# Patient Record
Sex: FEMALE | Race: ASIAN | NOT HISPANIC OR LATINO | Employment: UNEMPLOYED | URBAN - METROPOLITAN AREA
[De-identification: names, ages, dates, MRNs, and addresses within clinical notes are randomized per-mention and may not be internally consistent; named-entity substitution may affect disease eponyms.]

---

## 2024-02-23 ENCOUNTER — OFFICE VISIT (OUTPATIENT)
Dept: FAMILY MEDICINE | Facility: CLINIC | Age: 39
End: 2024-02-23
Payer: COMMERCIAL

## 2024-02-23 VITALS
OXYGEN SATURATION: 98 % | RESPIRATION RATE: 16 BRPM | DIASTOLIC BLOOD PRESSURE: 72 MMHG | TEMPERATURE: 98 F | HEART RATE: 77 BPM | SYSTOLIC BLOOD PRESSURE: 105 MMHG

## 2024-02-23 DIAGNOSIS — G44.219 EPISODIC TENSION-TYPE HEADACHE, NOT INTRACTABLE: ICD-10-CM

## 2024-02-23 DIAGNOSIS — R10.13 EPIGASTRIC BURNING SENSATION: ICD-10-CM

## 2024-02-23 DIAGNOSIS — R42 DIZZINESS: Primary | ICD-10-CM

## 2024-02-23 PROCEDURE — 84443 ASSAY THYROID STIM HORMONE: CPT | Performed by: FAMILY MEDICINE

## 2024-02-23 PROCEDURE — 80053 COMPREHEN METABOLIC PANEL: CPT | Performed by: FAMILY MEDICINE

## 2024-02-23 PROCEDURE — 99204 OFFICE O/P NEW MOD 45 MIN: CPT | Performed by: FAMILY MEDICINE

## 2024-02-23 RX ORDER — PANTOPRAZOLE SODIUM 40 MG/1
40 TABLET, DELAYED RELEASE ORAL DAILY
COMMUNITY
End: 2024-02-23

## 2024-02-23 RX ORDER — MECLIZINE HYDROCHLORIDE 25 MG/1
25 TABLET ORAL 3 TIMES DAILY PRN
Qty: 30 TABLET | Refills: 1 | Status: SHIPPED | OUTPATIENT
Start: 2024-02-23 | End: 2024-04-18

## 2024-02-23 RX ORDER — ACETAMINOPHEN 325 MG/1
650 TABLET ORAL EVERY 8 HOURS PRN
Qty: 100 TABLET | Refills: 0 | Status: SHIPPED | OUTPATIENT
Start: 2024-02-23 | End: 2024-07-11

## 2024-02-23 RX ORDER — PANTOPRAZOLE SODIUM 40 MG/1
40 TABLET, DELAYED RELEASE ORAL DAILY
Qty: 30 TABLET | Refills: 1 | Status: SHIPPED | OUTPATIENT
Start: 2024-02-23 | End: 2024-04-18

## 2024-04-17 SDOH — HEALTH STABILITY: PHYSICAL HEALTH: ON AVERAGE, HOW MANY MINUTES DO YOU ENGAGE IN EXERCISE AT THIS LEVEL?: 20 MIN

## 2024-04-17 SDOH — HEALTH STABILITY: PHYSICAL HEALTH: ON AVERAGE, HOW MANY DAYS PER WEEK DO YOU ENGAGE IN MODERATE TO STRENUOUS EXERCISE (LIKE A BRISK WALK)?: 2 DAYS

## 2024-04-17 ASSESSMENT — SOCIAL DETERMINANTS OF HEALTH (SDOH): HOW OFTEN DO YOU GET TOGETHER WITH FRIENDS OR RELATIVES?: NEVER

## 2024-04-18 ENCOUNTER — VIRTUAL VISIT (OUTPATIENT)
Dept: INTERPRETER SERVICES | Facility: CLINIC | Age: 39
End: 2024-04-18

## 2024-04-18 ENCOUNTER — OFFICE VISIT (OUTPATIENT)
Dept: FAMILY MEDICINE | Facility: CLINIC | Age: 39
End: 2024-04-18
Payer: COMMERCIAL

## 2024-04-18 VITALS
SYSTOLIC BLOOD PRESSURE: 102 MMHG | WEIGHT: 137.4 LBS | DIASTOLIC BLOOD PRESSURE: 71 MMHG | HEART RATE: 77 BPM | BODY MASS INDEX: 26.97 KG/M2 | HEIGHT: 60 IN | TEMPERATURE: 98.2 F | RESPIRATION RATE: 16 BRPM | OXYGEN SATURATION: 100 %

## 2024-04-18 DIAGNOSIS — Z11.4 SCREENING FOR HIV (HUMAN IMMUNODEFICIENCY VIRUS): ICD-10-CM

## 2024-04-18 DIAGNOSIS — Z11.59 NEED FOR HEPATITIS C SCREENING TEST: ICD-10-CM

## 2024-04-18 DIAGNOSIS — R42 DIZZINESS: ICD-10-CM

## 2024-04-18 DIAGNOSIS — R10.13 EPIGASTRIC BURNING SENSATION: ICD-10-CM

## 2024-04-18 DIAGNOSIS — Z12.4 CERVICAL CANCER SCREENING: ICD-10-CM

## 2024-04-18 DIAGNOSIS — Z12.31 VISIT FOR SCREENING MAMMOGRAM: ICD-10-CM

## 2024-04-18 DIAGNOSIS — F17.200 TOBACCO USE DISORDER: ICD-10-CM

## 2024-04-18 DIAGNOSIS — F41.9 ANXIETY: ICD-10-CM

## 2024-04-18 DIAGNOSIS — Z00.00 ENCOUNTER FOR ANNUAL PHYSICAL EXAM: Primary | ICD-10-CM

## 2024-04-18 LAB — FOLATE SERPL-MCNC: 6.2 NG/ML (ref 4.6–34.8)

## 2024-04-18 PROCEDURE — 82746 ASSAY OF FOLIC ACID SERUM: CPT | Performed by: FAMILY MEDICINE

## 2024-04-18 PROCEDURE — 82607 VITAMIN B-12: CPT | Performed by: FAMILY MEDICINE

## 2024-04-18 PROCEDURE — 86803 HEPATITIS C AB TEST: CPT | Performed by: FAMILY MEDICINE

## 2024-04-18 PROCEDURE — 87389 HIV-1 AG W/HIV-1&-2 AB AG IA: CPT | Performed by: FAMILY MEDICINE

## 2024-04-18 PROCEDURE — 99214 OFFICE O/P EST MOD 30 MIN: CPT | Mod: 25 | Performed by: FAMILY MEDICINE

## 2024-04-18 PROCEDURE — 99396 PREV VISIT EST AGE 40-64: CPT | Performed by: FAMILY MEDICINE

## 2024-04-18 PROCEDURE — 36415 COLL VENOUS BLD VENIPUNCTURE: CPT | Performed by: FAMILY MEDICINE

## 2024-04-18 PROCEDURE — 80061 LIPID PANEL: CPT | Performed by: FAMILY MEDICINE

## 2024-04-18 RX ORDER — HYDROXYZINE HYDROCHLORIDE 10 MG/1
10-20 TABLET, FILM COATED ORAL EVERY 8 HOURS PRN
Qty: 60 TABLET | Refills: 1 | Status: SHIPPED | OUTPATIENT
Start: 2024-04-18 | End: 2024-06-21

## 2024-04-18 RX ORDER — PANTOPRAZOLE SODIUM 40 MG/1
40 TABLET, DELAYED RELEASE ORAL DAILY
Qty: 30 TABLET | Refills: 1 | Status: SHIPPED | OUTPATIENT
Start: 2024-04-18 | End: 2024-06-21

## 2024-04-18 RX ORDER — MECLIZINE HYDROCHLORIDE 25 MG/1
25 TABLET ORAL 3 TIMES DAILY PRN
Qty: 30 TABLET | Refills: 1 | Status: SHIPPED | OUTPATIENT
Start: 2024-04-18 | End: 2024-07-23

## 2024-04-18 ASSESSMENT — PATIENT HEALTH QUESTIONNAIRE - PHQ9
SUM OF ALL RESPONSES TO PHQ QUESTIONS 1-9: 4
5. POOR APPETITE OR OVEREATING: NOT AT ALL

## 2024-04-18 ASSESSMENT — ANXIETY QUESTIONNAIRES
GAD7 TOTAL SCORE: 2
1. FEELING NERVOUS, ANXIOUS, OR ON EDGE: NOT AT ALL
5. BEING SO RESTLESS THAT IT IS HARD TO SIT STILL: SEVERAL DAYS
GAD7 TOTAL SCORE: 2
6. BECOMING EASILY ANNOYED OR IRRITABLE: NOT AT ALL
3. WORRYING TOO MUCH ABOUT DIFFERENT THINGS: NOT AT ALL
2. NOT BEING ABLE TO STOP OR CONTROL WORRYING: SEVERAL DAYS
7. FEELING AFRAID AS IF SOMETHING AWFUL MIGHT HAPPEN: NOT AT ALL
IF YOU CHECKED OFF ANY PROBLEMS ON THIS QUESTIONNAIRE, HOW DIFFICULT HAVE THESE PROBLEMS MADE IT FOR YOU TO DO YOUR WORK, TAKE CARE OF THINGS AT HOME, OR GET ALONG WITH OTHER PEOPLE: SOMEWHAT DIFFICULT

## 2024-04-18 NOTE — PATIENT INSTRUCTIONS
-Thank you for choosing the [facility].  -It was a pleasure to see you today.  -Please take a look at the information below for more specific details regarding the treatment plan and recommendations.  -In this after visit summary is a list of your medications and specific instructions.  Please review this carefully as there may be changes made to your medication list.  -If there are any particular questions or concerns, please feel free to reach out to Dr. Harper.  -If any labs have been completed, we will reach out to you about results.  If the results are normal or not concerning, a letter or MyChart message will be sent to you.  If any follow-up is needed, either Dr. Harper or the nurse will give you a call.  If you have not heard regarding results after 2 weeks, please reach out to the clinic.    Patient Instructions:    -Stop smoking cigarettes.  This is the best thing you can do to improve your health.    -Take the medications as prescribed.  -Return to clinic in 1-2 months to remove the nexplanon.     -You are doing great.  -Continue to eat well.  Try to increase your servings of calcium as this can help your bones stay strong and healthy.  Follow a nutrition plan patient fruits and vegetables and low in fats and cholesterol.    -Be sure to eat 5-7 servings of fruits and vegetables each day.  -Find ways to stay active.  Try to get 150 minutes of moderate activity (where you are breathing faster and slightly sweating) each week.  -Try to maintain a body mass index (BMI) of 18.5-25 as this is considered a healthier weight range.  -Brush your teeth twice daily.  See a dentist every 6-12 months.  -Be sure to use sunblock with SPF 15 or greater when going outside for extended periods of time.  Sunblock should be used even when it is a cloudy day.  Do intermittent skin checks for any concerning skin changes.  Wearing a wide brimmed hat and sunglasses can also be helpful to protect your skin from  the sun.  -Monitor for any abnormal skin changes (such as new moles/spots, painful moles, changes in your old moles, wounds that will not heal, multiple colors noted in one lesion, lesions that are asymmetric or not circular, or anything that is concerning for you). If any of these are noted, please schedule an appointment to be seen.     -It is generally recommended for you to complete a health care directive or living will. These documents will be able to reflect your wishes and desire in the case that you are unable to express them yourself. Please let Dr. Harper know if you would like some assistance with this process.    -For the preventive health procedure (such as colonoscopy, mammogram, etc) order from today, if you do not hear within a week's time from the specialist to schedule an appointment, please call the [facility] and speak with the specialty  to help you schedule the appointment. Depending on the specialist availability, it may be a number of weeks prior to your scheduled appointment.        Please seek immediate medical attention (go to the emergency room or urgent care) for the following reasons: worsening symptoms, or any concerning changes.      --------------------------------------------------------------------------------------------------------------------    -We are always looking for ways to improve.  You may be selected to receive a survey regarding your visit today.  We encourage you to complete the survey and provide specific, constructive feedback to help us improve our processes.  Thank you for your time!  -Please review the contact information listed on the after visit summary and in the electronic chart.  Below is the phone number that we have on file.  If there are any changes that are needed to be made, please reach out to the clinic.  [phone] (home)

## 2024-04-18 NOTE — PROGRESS NOTES
Preventive Care Visit  [facility] RAYMUNDO Harper MD, Family Medicine  Apr 18, 2024      Assessment & Plan     Patient Instructions:    -Stop smoking cigarettes.  This is the best thing you can do to improve your health.    -Take the medications as prescribed.  -Return to clinic in 1-2 months to remove the nexplanon.     -You are doing great.  -Continue to eat well.  Try to increase your servings of calcium as this can help your bones stay strong and healthy.  Follow a nutrition plan patient fruits and vegetables and low in fats and cholesterol.    -Be sure to eat 5-7 servings of fruits and vegetables each day.  -Find ways to stay active.  Try to get 150 minutes of moderate activity (where you are breathing faster and slightly sweating) each week.  -Try to maintain a body mass index (BMI) of 18.5-25 as this is considered a healthier weight range.  -Brush your teeth twice daily.  See a dentist every 6-12 months.  -Be sure to use sunblock with SPF 15 or greater when going outside for extended periods of time.  Sunblock should be used even when it is a cloudy day.  Do intermittent skin checks for any concerning skin changes.  Wearing a wide brimmed hat and sunglasses can also be helpful to protect your skin from the sun.  -Monitor for any abnormal skin changes (such as new moles/spots, painful moles, changes in your old moles, wounds that will not heal, multiple colors noted in one lesion, lesions that are asymmetric or not circular, or anything that is concerning for you). If any of these are noted, please schedule an appointment to be seen.     -It is generally recommended for you to complete a health care directive or living will. These documents will be able to reflect your wishes and desire in the case that you are unable to express them yourself. Please let Dr. Harper know if you would like some assistance with this process.    -For the preventive health procedure (such as colonoscopy,  mammogram, etc) order from today, if you do not hear within a week's time from the specialist to schedule an appointment, please call the [facility] and speak with the specialty  to help you schedule the appointment. Depending on the specialist availability, it may be a number of weeks prior to your scheduled appointment.        Please seek immediate medical attention (go to the emergency room or urgent care) for the following reasons: worsening symptoms, or any concerning changes.    Say was seen today for physical.  Diagnoses and all orders for this visit:    Encounter for annual physical exam  -     Lipid panel reflex to direct LDL Non-fasting; Future    Visit for screening mammogram: declined.     Screening for HIV (human immunodeficiency virus)  -     HIV Antigen Antibody Combo; Future    Need for hepatitis C screening test  -     Hepatitis C Screen Reflex to HCV RNA Quant and Genotype; Future    Cervical cancer screening: patient will schedule with a female provider.     Tobacco use disorder: patient will work on this and reach out if she needs help.     Dizziness  Epigastric burning sensation: check labs as below. Plan to continue protonix and start meclizine.   -     meclizine (ANTIVERT) 25 MG tablet; Take 1 tablet (25 mg) by mouth 3 times daily as needed for dizziness  -     Lipid panel reflex to direct LDL Non-fasting; Future  -     Helicobacter pylori Antigen Stool; Future  -     pantoprazole (PROTONIX) 40 MG EC tablet; Take 1 tablet (40 mg) by mouth daily  -     Vitamin B12; Future  -     Folate; Future    Anxiety: has done well on a faster acting medication before. Unable to find medication in chart. Discussed options, risks, benefits and plans are for the below.   -     Vitamin B12; Future  -     Folate; Future  -     hydrOXYzine HCl (ATARAX) 10 MG tablet; Take 1-2 tablets (10-20 mg) by mouth every 8 hours as needed for itching or anxiety (sleep)      Patient has been advised of split  "billing requirements and indicates understanding: Yes (by nurse)        BMI  Estimated body mass index is 26.45 kg/m  as calculated from the following:    Height as of this encounter: 1.535 m (5' 0.43\").    Weight as of this encounter: 62.3 kg (137 lb 6.4 oz).       Counseling  Appropriate preventive services were discussed with this patient, including applicable screening as appropriate for fall prevention, nutrition, physical activity, Tobacco-use cessation, weight loss and cognition.  Checklist reviewing preventive services available has been given to the patient.  Reviewed patient's diet, addressing concerns and/or questions.   She is at risk for lack of exercise and has been provided with information to increase physical activity for the benefit of her well-being.   Patient is at risk for social isolation and has been provided with information about the benefit of social connection.   The patient was instructed to see the dentist every 6 months.   She is at risk for psychosocial distress and has been provided with information to reduce risk.       Subjective   Say is a 39 year old, presenting for the following:  Physical        4/18/2024     9:20 AM   Additional Questions   Roomed by olivia guaman   Accompanied by Self        Health Care Directive  Patient does not have a Health Care Directive or Living Will: See AVS.    HPI    The patient is new to the clinic and had been seen 1 time for an acute visit.    Wants Nexplanon removal. Placed in 2021 (around the spring/summer time) in the left arm. No problems with the nexplanon. Patient would like to use the depo shot going forward after removal. Due to time constraints, patient will schedule for a follow up.     The pantoprazole is helpful.     The meclizine is helpful with the nausea symptoms. The dizziness is sometimes better, sometimes worse. The medications are helpful overall for the headaches and associated symptoms. She feels that the right side of the head is " heavy. Reviewed treatment options.     The medications help a lot.     She has mental health issues that come and goes. When it occurs, she has pains in her back. She thinks she is going through menopause because these changes are typically noted when she is menstruating. She continues to menstruate regularly. There are discomforts in the heart that then triggers feeling unwell mentally. Symptoms started when she delivered her baby and can occur every 1-2 months. In the past, she had been prescribed an antianxiety medication and it was helpful. She doesn't recall the name. The medication was very helpful. This medication was only used as needed. No daily medication.           2024    10:35 AM   PHQ   PHQ-9 Total Score 4   Q9: Thoughts of better off dead/self-harm past 2 weeks Not at all         2024    10:35 AM   KARISSA-7 SCORE   Total Score 2           Patient is originally from [place]. Moved to [place] 8 years ago. Lived in [place] and then moved to [place] 8-9 months ago.     QUIRINO signed for clinic in [place].     Medical history:   -Denies history of CAD, diabetes, hypertension.      Surgical history:   -  -Tubes were tied, she gained weight, and the tie slipped and she ended up having another baby after that.  She has a nexplanon in place now.     Family history:  -Denies family history of CAD, diabetes, hypertension, breast cancer.       -Reviewed healthy eating and exercise.  -Skin cancer screening: No concerning skin changes expressed by patient.  -Smoking status:   Tobacco Use      Smoking status: Every Day        Types: Cigarettes        Passive exposure: Current      Smokeless tobacco: Never  .  Reviewed smoking cessation recommendations.      Preventative health recommendations, evaluation options, and risk/benefits of each were discussed with patient. Accepted recommendations were ordered. Will await records before giving vaccinations.   Health Maintenance Due   Topic Date Due    YEARLY  PREVENTIVE VISIT  Never done    ANNUAL REVIEW OF HM ORDERS  Never done    ADVANCE CARE PLANNING  Never done    MAMMO SCREENING  Never done    Pneumococcal Vaccine: Pediatrics (0 to 5 Years) and At-Risk Patients (6 to 64 Years) (1 of 2 - PCV) Never done    HIV SCREENING  Never done    HEPATITIS C SCREENING  Never done    HEPATITIS B IMMUNIZATION (1 of 3 - 19+ 3-dose series) Never done    PAP  Never done    DTAP/TDAP/TD IMMUNIZATION (1 - Tdap) Never done    LIPID  Never done    INFLUENZA VACCINE (1) Never done    COVID-19 Vaccine (1 - 2023-24 season) Never done       -Immunizations due were reviewed.      There is no immunization history on file for this patient.    -Labs: Laboratory recommendations reviewed with patient.  Reviewed normal labs from 2/23/2024 which included: CMP, A1c, TSH, CBC.  -STD testing recommended (including HIV, Hep C)    -Breast cancer screening: biennial screening mammography for women aged 50 to 74 years. Last mammogram: never. Patient is low risk. Patient defers testing at this time.      -Cervical cancer screening: Last Pap smear: Never done. Patient prefers a female provider.             4/17/2024   General Health   How would you rate your overall physical health? (!) FAIR   Feel stress (tense, anxious, or unable to sleep) Only a little   (!) STRESS CONCERN      4/17/2024   Nutrition   Three or more servings of calcium each day? Yes   Diet: Regular (no restrictions)   How many servings of fruit and vegetables per day? (!) 0-1   How many sweetened beverages each day? 0-1         4/17/2024   Exercise   Days per week of moderate/strenous exercise 2 days   Average minutes spent exercising at this level 20 min   (!) EXERCISE CONCERN      4/17/2024   Social Factors   Frequency of gathering with friends or relatives Never   Worry food won't last until get money to buy more No   Food not last or not have enough money for food? No   Do you have housing?  Yes   Are you worried about losing your  housing? No   Lack of transportation? No   Unable to get utilities (heat,electricity)? No   (!) SOCIAL CONNECTIONS CONCERN      4/17/2024   Dental   Dentist two times every year? (!) NO         4/17/2024   TB Screening   Were you born outside of the [place]? Yes         Today's PHQ-2 Score:       4/17/2024     2:27 PM   PHQ-2 ( 1999 [employer])   Q1: Little interest or pleasure in doing things 1   Q2: Feeling down, depressed or hopeless 1   PHQ-2 Score 2   Q1: Little interest or pleasure in doing things Several days   Q2: Feeling down, depressed or hopeless Several days   PHQ-2 Score 2           4/17/2024   Substance Use   If I could quit smoking, I would Somewhat agree   I want to quit somking, worry about health affects Somewhat agree   Willing to make a plan to quit smoking Somewhat agree   Willing to cut down before quitting Somewhat agree   Alcohol more than 3/day or more than 7/wk No   Do you use any other substances recreationally? No     Social History     Tobacco Use    Smoking status: Every Day     Types: Cigarettes     Passive exposure: Current    Smokeless tobacco: Never   Vaping Use    Vaping status: Never Used             4/17/2024   One time HIV Screening   Previous HIV test? No         4/17/2024   STI Screening   New sexual partner(s) since last STI/HIV test? No     History of abnormal Pap smear: No previous history available.       ASCVD Risk   The 10-year ASCVD risk score (Michael MANNING, et al., 2019) is: 1.5%    Values used to calculate the score:      Age: 39 years      Sex: Female      Is Non- : No      Diabetic: No      Tobacco smoker: Yes      Systolic Blood Pressure: 102 mmHg      Is BP treated: No      HDL Cholesterol: 61 mg/dL      Total Cholesterol: 213 mg/dL        4/17/2024   Contraception/Family Planning   Questions about contraception or family planning No       Reviewed and updated as needed this visit by Provider     Meds  Problems  Med Hx  Surg Hx  Fam Hx     "        History reviewed. No pertinent past medical history.  Past Surgical History:   Procedure Laterality Date    C/SECTION, LOW TRANSVERSE         The 10 point review of system was negative unless otherwise stated in the HPI.       Objective    Exam  /71 (BP Location: Right arm, Patient Position: Sitting, Cuff Size: Adult Regular)   Pulse 77   Temp 98.2  F (36.8  C) (Oral)   Resp 16   Ht 1.535 m (5' 0.43\")   Wt 62.3 kg (137 lb 6.4 oz)   LMP 02/13/2024 (Approximate)   SpO2 100%   BMI 26.45 kg/m     Estimated body mass index is 26.45 kg/m  as calculated from the following:    Height as of this encounter: 1.535 m (5' 0.43\").    Weight as of this encounter: 62.3 kg (137 lb 6.4 oz).    Physical Exam  GENERAL: alert and no distress  EYES: Eyes grossly normal to inspection, PERRL and conjunctivae and sclerae normal  HENT: ear canals and TM's normal, nose and mouth without ulcers or lesions  NECK: no adenopathy, no asymmetry, masses, or scars  RESP: lungs clear to auscultation - no rales, rhonchi or wheezes  CV: regular rate and rhythm, normal S1 S2, no S3 or S4, no murmur, click or rub, no peripheral edema  ABDOMEN: mild epigastric discomforts. Otherwise, abdomen is soft, nontender, no hepatosplenomegaly, no masses and bowel sounds normal  MS: no gross musculoskeletal defects noted, no edema  SKIN: no suspicious lesions or rashes  NEURO: Normal strength and tone, mentation intact and speech normal  PSYCH: mentation appears normal, affect normal/bright        Signed Electronically by:       Abisai Harper MD  [facility] [place] [address]  Phone: [phone]  Fax: [phone]    4/22/2024  6:38 AM    "

## 2024-04-19 LAB
CHOLEST SERPL-MCNC: 213 MG/DL
FASTING STATUS PATIENT QL REPORTED: YES
HDLC SERPL-MCNC: 61 MG/DL
HIV 1+2 AB+HIV1 P24 AG SERPL QL IA: NONREACTIVE
LDLC SERPL CALC-MCNC: 123 MG/DL
NONHDLC SERPL-MCNC: 152 MG/DL
TRIGL SERPL-MCNC: 143 MG/DL
VIT B12 SERPL-MCNC: 554 PG/ML (ref 232–1245)

## 2024-04-19 PROCEDURE — 87338 HPYLORI STOOL AG IA: CPT | Performed by: FAMILY MEDICINE

## 2024-04-20 LAB — HCV AB SERPL QL IA: NONREACTIVE

## 2024-04-22 PROBLEM — F17.200 TOBACCO USE DISORDER: Status: ACTIVE | Noted: 2024-04-22

## 2024-04-22 PROBLEM — R10.13 EPIGASTRIC BURNING SENSATION: Status: ACTIVE | Noted: 2024-04-22

## 2024-04-22 PROBLEM — R42 DIZZINESS: Status: ACTIVE | Noted: 2024-04-22

## 2024-04-22 PROBLEM — F41.9 ANXIETY: Status: ACTIVE | Noted: 2024-04-22

## 2024-04-22 LAB — H PYLORI AG STL QL IA: NEGATIVE

## 2024-04-23 ENCOUNTER — TELEPHONE (OUTPATIENT)
Dept: FAMILY MEDICINE | Facility: CLINIC | Age: 39
End: 2024-04-23
Payer: COMMERCIAL

## 2024-04-23 NOTE — TELEPHONE ENCOUNTER
----- Message from Abisai Harper MD sent at 4/23/2024  5:11 PM CDT -----  Team - please call patient with results.    Hello Say Hiwot Renato,    I hope you have been well since our last visit. Below are the results from the testing completed at the visit.     The total cholesterol and LDL or bad cholesterol are high.  The triglycerides, which is primarily affected by food, is normal. The HDL or good cholesterol are in the normal range.  No medications are recommended at this time, though you should try to follow a diet that is rich in fruits and vegetables and low in fats and cholesterol.  In addition, find ways to stay active.  Doing aerobic activities (such as running, biking, etc.) will help improve the HDL or good cholesterol.      The rest of the tests were in the good range.    Dr. Harper recommends that you continue on the plan as discussed in clinic.    If there are any questions or concerns, please call the clinic or schedule an appointment for follow up.     Best wishes,           Abisai Harper MD  [facility]  4/23/2024  5:11 PM

## 2024-06-21 ENCOUNTER — OFFICE VISIT (OUTPATIENT)
Dept: FAMILY MEDICINE | Facility: CLINIC | Age: 39
End: 2024-06-21
Payer: COMMERCIAL

## 2024-06-21 VITALS
DIASTOLIC BLOOD PRESSURE: 66 MMHG | SYSTOLIC BLOOD PRESSURE: 106 MMHG | TEMPERATURE: 98.1 F | OXYGEN SATURATION: 99 % | RESPIRATION RATE: 14 BRPM | HEART RATE: 73 BPM

## 2024-06-21 DIAGNOSIS — F41.9 ANXIETY: ICD-10-CM

## 2024-06-21 DIAGNOSIS — L30.9 DERMATITIS: ICD-10-CM

## 2024-06-21 DIAGNOSIS — J02.9 PHARYNGITIS, UNSPECIFIED ETIOLOGY: ICD-10-CM

## 2024-06-21 DIAGNOSIS — L50.3 DERMATOGRAPHIA: ICD-10-CM

## 2024-06-21 DIAGNOSIS — R10.13 EPIGASTRIC BURNING SENSATION: Primary | ICD-10-CM

## 2024-06-21 LAB
DEPRECATED S PYO AG THROAT QL EIA: NEGATIVE
GROUP A STREP BY PCR: NOT DETECTED

## 2024-06-21 PROCEDURE — 99215 OFFICE O/P EST HI 40 MIN: CPT | Performed by: FAMILY MEDICINE

## 2024-06-21 PROCEDURE — 87651 STREP A DNA AMP PROBE: CPT | Performed by: FAMILY MEDICINE

## 2024-06-21 RX ORDER — LORATADINE 10 MG/1
10 TABLET ORAL DAILY
Qty: 30 TABLET | Refills: 0 | Status: SHIPPED | OUTPATIENT
Start: 2024-06-21 | End: 2024-07-21

## 2024-06-21 RX ORDER — HYDROXYZINE HYDROCHLORIDE 10 MG/1
10-20 TABLET, FILM COATED ORAL EVERY 8 HOURS PRN
Qty: 60 TABLET | Refills: 0 | Status: SHIPPED | OUTPATIENT
Start: 2024-06-21 | End: 2024-07-23

## 2024-06-21 RX ORDER — TRIAMCINOLONE ACETONIDE 1 MG/G
CREAM TOPICAL 2 TIMES DAILY
Qty: 80 G | Refills: 0 | Status: SHIPPED | OUTPATIENT
Start: 2024-06-21

## 2024-06-21 RX ORDER — PANTOPRAZOLE SODIUM 40 MG/1
40 TABLET, DELAYED RELEASE ORAL DAILY
Qty: 30 TABLET | Refills: 0 | Status: SHIPPED | OUTPATIENT
Start: 2024-06-21 | End: 2024-07-11

## 2024-06-21 NOTE — PATIENT INSTRUCTIONS
Start the new prescription of pantoprazole daily for the next 30 days for the burning sensation in your stomach    The hydroxyzine at bedtime to help with the burning sensation on your back until the rash resolves    you can also take it up to 6 every 6 hours during the day if you are having anxiety or worsening burning this will make you sleepy    Apply triamcinolone cream to your back in a.m. and bedtime once the rash resolves stop the triamcinolone cream, do not use it more than 2 weeks    Start Claritin 10 mg daily for the next 30 days to treat the rash on your back and complete the full 30 days of the rash does not come back        Return to clinic or to ER if symptoms worsen     Follow up with your primary care provider or clinic in about 2-3 days  if your symptoms do not improve                          please make an appointment with your primary care provider to be seen in 2 weeks to follow-up on the burning in your stomach-    patient has an appointment with PCP on 7/11/2024-can follow up on stomach burning, no need to make a follow up appointment in 2 weeks-reviewed with patient at time of visit and she understands and agrees with plan

## 2024-06-21 NOTE — PROGRESS NOTES
ASSESSMENT/PLAN:      ICD-10-CM    1. Epigastric burning sensation  R10.13 pantoprazole (PROTONIX) 40 MG EC tablet    hx of similar year ago, no pain with palpation but pt feeling the burning, only takes pantoprazole prn, will resume daily dosing which resolved symptoms in past      2. Dermatitis  L30.9 hydrOXYzine HCl (ATARAX) 10 MG tablet     triamcinolone (KENALOG) 0.1 % external cream     loratadine (CLARITIN) 10 MG tablet    back      3. Dermatographia  L50.3 hydrOXYzine HCl (ATARAX) 10 MG tablet     loratadine (CLARITIN) 10 MG tablet    note on exam of rash on back      4. Pharyngitis, unspecified etiology  J02.9 Streptococcus A Rapid Screen w/Reflex to PCR - Clinic Collect     Group A Streptococcus PCR Throat Swab    rapid strep negative      5. Anxiety  F41.9 hydrOXYzine HCl (ATARAX) 10 MG tablet          Patient Instructions     Start the new prescription of pantoprazole daily for the next 30 days for the burning sensation in your stomach    The hydroxyzine at bedtime to help with the burning sensation on your back until the rash resolves    you can also take it up to 6 every 6 hours during the day if you are having anxiety or worsening burning this will make you sleepy    Apply triamcinolone cream to your back in a.m. and bedtime once the rash resolves stop the triamcinolone cream, do not use it more than 2 weeks    Start Claritin 10 mg daily for the next 30 days to treat the rash on your back and complete the full 30 days of the rash does not come back        Return to clinic or to ER if symptoms worsen     Follow up with your primary care provider or clinic in about 2-3 days  if your symptoms do not improve                          please make an appointment with your primary care provider to be seen in 2 weeks to follow-up on the burning in your stomach-    patient has an appointment with PCP on 7/11/2024-can follow up on stomach burning, no need to make a follow up appointment in 2 weeks-reviewed  with patient at time of visit and she understands and agrees with plan     Reviewed medication instructions and side effects. Follow up if experiences side effects.     I reviewed supportive care, otc meds to use if needed, expected course, and signs of concern.  Follow up as needed or if she does not improve within  1-2 days or if worsens in any way.  Reviewed red flag symptoms and is to go to the ER if experiences any of these.     The use of Dragon/[employer] services may have been used to construct the content in this note; any grammatical or spelling errors are non-intentional. Please contact the author of this note directly if you are in need of any clarification.      On the day of the encounter, time spend on chart review, patient visit, review of testing, documentation was 40 minutes      Due to language barrier, an  was present during the history-taking and subsequent discussion  and the physical exam with this patient.         Patient presents with:  Abdominal Pain: Burning in lower abdomin and over body for last 4 day.  Chest Pain: Occasional sharp pain lower chest to back.       Subjective     Say Hiwot Gross is a 39 year old female who presents to clinic today for the following health issues:    HPI   Patient is a 39-year-old female with history of anxiety, episodic gastric burning, anxiety and dizziness  Also continues to smoke  Patient is on meclizine for dizziness hydroxyzine for anxiety and pantoprazole for her GERD  Patient with onset 4 days ago of midepigastric burning and burning pain in her back-similar symptoms about a year ago when she was started on her pantoprazole  At present she is been taking her pantoprazole as needed last time she took a dose was 5 days ago  She is also prescribed hydroxyzine for anxiety she has not taken in the past week  Mild nausea no vomiting or diarrhea, no constipation  No fever or chills, no sore throat, no cough, no dizziness, no UTI  symptoms  Nexplanon for birth control  No other family members are ill at this time no one with history of recent strep or  flu          No past medical history on file.  Social History     Tobacco Use    Smoking status: Every Day     Types: Cigarettes     Passive exposure: Current    Smokeless tobacco: Never   Substance Use Topics    Alcohol use: Not on file       Current Outpatient Medications   Medication Sig Dispense Refill    acetaminophen (TYLENOL) 325 MG tablet Take 2 tablets (650 mg) by mouth every 8 hours as needed for headaches 100 tablet 0    hydrOXYzine HCl (ATARAX) 10 MG tablet Take 1-2 tablets (10-20 mg) by mouth every 8 hours as needed for itching or anxiety (sleep) 60 tablet 0    loratadine (CLARITIN) 10 MG tablet Take 1 tablet (10 mg) by mouth daily for 30 days 30 tablet 0    meclizine (ANTIVERT) 25 MG tablet Take 1 tablet (25 mg) by mouth 3 times daily as needed for dizziness 30 tablet 1    pantoprazole (PROTONIX) 40 MG EC tablet Take 1 tablet (40 mg) by mouth daily 30 tablet 0    triamcinolone (KENALOG) 0.1 % external cream Apply topically 2 times daily DO NOT USE ON FACE 80 g 0     No Known Allergies          ROS are negative, except as otherwise noted HPI      Objective    /66   Pulse 73   Temp 98.1  F (36.7  C) (Oral)   Resp 14   SpO2 99%   There is no height or weight on file to calculate BMI.  Physical Exam   GENERAL: alert and no distress  EYES: Eyes grossly normal to inspection, PERRL and conjunctivae and sclerae normal  HENT: ear canals and TM's normal, nose and mouth without ulcers or lesions, mild pharyngeal erythema  NECK: A few shoddy anterior chain bilateral adenopathy, no asymmetry,   RESP: lungs clear to auscultation - no rales, rhonchi or wheezes  CV: regular rate and rhythm, normal S1 S2, no S3 or S4, no murmur, click or rub,   ABDOMEN: soft, nontender, no hepatosplenomegaly, no CVA tenderness bilateral, bowel sounds normal  MS: no gross musculoskeletal defects noted, no  edema  SKIN: Faint patches of pale pink rash with dermatographia covering her entire back  NEURO: Normal strength and tone, mentation intact and speech normal, normal gait       Diagnostic Test Results:  Labs reviewed in Epic  Results for orders placed or performed in visit on 06/21/24   Streptococcus A Rapid Screen w/Reflex to PCR - Clinic Collect     Status: Normal    Specimen: Throat; Swab   Result Value Ref Range    Group A Strep antigen Negative Negative

## 2024-07-11 ENCOUNTER — OFFICE VISIT (OUTPATIENT)
Dept: FAMILY MEDICINE | Facility: CLINIC | Age: 39
End: 2024-07-11
Payer: COMMERCIAL

## 2024-07-11 VITALS
DIASTOLIC BLOOD PRESSURE: 77 MMHG | HEART RATE: 70 BPM | WEIGHT: 138.08 LBS | TEMPERATURE: 97.5 F | BODY MASS INDEX: 27.11 KG/M2 | HEIGHT: 60 IN | RESPIRATION RATE: 16 BRPM | OXYGEN SATURATION: 100 % | SYSTOLIC BLOOD PRESSURE: 109 MMHG

## 2024-07-11 DIAGNOSIS — M79.662 PAIN IN BOTH LOWER LEGS: ICD-10-CM

## 2024-07-11 DIAGNOSIS — M79.661 PAIN IN BOTH LOWER LEGS: ICD-10-CM

## 2024-07-11 DIAGNOSIS — Z12.4 SCREENING FOR MALIGNANT NEOPLASM OF CERVIX: Primary | ICD-10-CM

## 2024-07-11 DIAGNOSIS — R10.13 EPIGASTRIC BURNING SENSATION: ICD-10-CM

## 2024-07-11 PROCEDURE — 87624 HPV HI-RISK TYP POOLED RSLT: CPT | Performed by: FAMILY MEDICINE

## 2024-07-11 PROCEDURE — 99214 OFFICE O/P EST MOD 30 MIN: CPT | Performed by: FAMILY MEDICINE

## 2024-07-11 PROCEDURE — G0145 SCR C/V CYTO,THINLAYER,RESCR: HCPCS | Performed by: FAMILY MEDICINE

## 2024-07-11 PROCEDURE — T1013 SIGN LANG/ORAL INTERPRETER: HCPCS | Mod: U4

## 2024-07-11 RX ORDER — ACETAMINOPHEN 500 MG
1000 TABLET ORAL 3 TIMES DAILY PRN
Qty: 100 TABLET | Refills: 3 | Status: SHIPPED | OUTPATIENT
Start: 2024-07-11

## 2024-07-11 RX ORDER — PANTOPRAZOLE SODIUM 40 MG/1
40 TABLET, DELAYED RELEASE ORAL DAILY
Qty: 90 TABLET | Refills: 0 | Status: SHIPPED | OUTPATIENT
Start: 2024-07-11 | End: 2024-07-23

## 2024-07-11 NOTE — PROGRESS NOTES
"  Assessment & Plan     Screening for malignant neoplasm of cervix  Does not remember if she has had screenings before, but she had 2 kids in the [place] so probably (8 kids total) ? But not in [place].   - Gynecologic Cytology (Pap) and HPV - Recommended Age 30-65 Years    Pain in both lower legs  Consistent with MSK, no concern for DVT, fracture, no masses, no lesions/rash. Treat symptoms with acetaminophen and topical diclofenac. Avoiding nsaids due to issues with gastritis recently.   - acetaminophen (TYLENOL) 500 MG tablet  Dispense: 100 tablet; Refill: 3  - diclofenac (VOLTAREN) 1 % topical gel  Dispense: 350 g; Refill: 3    Epigastric burning sensation  Refilled pantoprazole.   - pantoprazole (PROTONIX) 40 MG EC tablet  Dispense: 90 tablet; Refill: 0    RUQ pain   Comes and goes with her period and has been happening for a really long time. She also said she had some kind of surgery in [place] before her last pregnancy and she thought it was a tubal but then she had her last kid. Differential includes endometriosis.     BMI  Estimated body mass index is 26.58 kg/m  as calculated from the following:    Height as of this encounter: 1.535 m (5' 0.43\").    Weight as of this encounter: 62.6 kg (138 lb 1.3 oz).   Weight management plan: Patient was referred to their PCP to discuss a diet and exercise plan.      Return in about 2 weeks (around 7/25/2024) for nexplanon remval .      Subjective   Say is a 39 year old, presenting for the following health issues:  Gyn Exam (Pap )        7/11/2024    10:00 AM   Additional Questions   Roomed by Norma VELASCO MA   Accompanied by Self     History of Present Illness       Reason for visit:  Appointment    She eats 2-3 servings of fruits and vegetables daily.She consumes 0 sweetened beverage(s) daily.She exercises with enough effort to increase her heart rate 10 to 19 minutes per day.  She exercises with enough effort to increase her heart rate 4 days per week.   She is taking medications " "regularly.       She has 8 kids, 2 born in the [place].   The youngest is 3.     Stomach burning is a little better. Went to urgent care for this - continued protonix that pcp already prescribed.     Left rib pain for along time.   Since then started those symptoms.   Has this discomfort whenever she is on her birth control   She is on nexplanon and having it removed in 2 weeks.     She thought she had her tubes tied but unsure?   She had it done in [place]           Objective    /77   Pulse 70   Temp 97.5  F (36.4  C) (Temporal)   Resp 16   Ht 1.535 m (5' 0.43\")   Wt 62.6 kg (138 lb 1.3 oz)   LMP 07/10/2024 (Exact Date)   SpO2 100%   BMI 26.58 kg/m    Body mass index is 26.58 kg/m .  Physical Exam   GENERAL: alert and no distress  NECK: no adenopathy, no asymmetry, masses, or scars  RESP: lungs clear to auscultation - no rales, rhonchi or wheezes  CV: regular rate and rhythm, normal S1 S2, no S3 or S4, no murmur, click or rub, no peripheral edema  ABDOMEN: soft, nontender, no hepatosplenomegaly, no masses and bowel sounds normal  MS: no gross musculoskeletal defects noted, no edema   (female): External genitalia is without lesions. Introitus is normal, vaginal walls pink and moist without lesions or evidence of trauma. No cervical motion tenderness. No adnexal masses. No cervical lesions or discharge      No results found for any visits on 07/11/24.  No results found for this or any previous visit (from the past 24 hour(s)).        Signed Electronically by: Desiree Skaggs MD    "

## 2024-07-12 LAB
HPV HR 12 DNA CVX QL NAA+PROBE: NEGATIVE
HPV16 DNA CVX QL NAA+PROBE: NEGATIVE
HPV18 DNA CVX QL NAA+PROBE: NEGATIVE
HUMAN PAPILLOMA VIRUS FINAL DIAGNOSIS: NORMAL

## 2024-07-17 LAB
BKR LAB AP GYN ADEQUACY: NORMAL
BKR LAB AP GYN INTERPRETATION: NORMAL
BKR LAB AP PREVIOUS ABNORMAL: NORMAL
PATH REPORT.COMMENTS IMP SPEC: NORMAL
PATH REPORT.COMMENTS IMP SPEC: NORMAL
PATH REPORT.RELEVANT HX SPEC: NORMAL

## 2024-07-23 ENCOUNTER — OFFICE VISIT (OUTPATIENT)
Dept: FAMILY MEDICINE | Facility: CLINIC | Age: 39
End: 2024-07-23
Payer: COMMERCIAL

## 2024-07-23 VITALS
HEART RATE: 73 BPM | BODY MASS INDEX: 26.29 KG/M2 | OXYGEN SATURATION: 100 % | TEMPERATURE: 98.5 F | DIASTOLIC BLOOD PRESSURE: 71 MMHG | WEIGHT: 139.25 LBS | RESPIRATION RATE: 16 BRPM | HEIGHT: 61 IN | SYSTOLIC BLOOD PRESSURE: 102 MMHG

## 2024-07-23 DIAGNOSIS — F41.9 ANXIETY: ICD-10-CM

## 2024-07-23 DIAGNOSIS — L30.9 DERMATITIS: ICD-10-CM

## 2024-07-23 DIAGNOSIS — Z01.84 IMMUNITY STATUS TESTING: ICD-10-CM

## 2024-07-23 DIAGNOSIS — R53.83 LOW ENERGY: ICD-10-CM

## 2024-07-23 DIAGNOSIS — Z30.46 ENCOUNTER FOR NEXPLANON REMOVAL: ICD-10-CM

## 2024-07-23 DIAGNOSIS — L50.3 DERMATOGRAPHIA: ICD-10-CM

## 2024-07-23 DIAGNOSIS — R42 DIZZINESS: ICD-10-CM

## 2024-07-23 DIAGNOSIS — Z30.09 ENCOUNTER FOR COUNSELING REGARDING CONTRACEPTION: Primary | ICD-10-CM

## 2024-07-23 DIAGNOSIS — R10.13 EPIGASTRIC BURNING SENSATION: ICD-10-CM

## 2024-07-23 LAB
HAV AB SER QL IA: REACTIVE
HBV SURFACE AB SERPL IA-ACNC: 238 M[IU]/ML
HBV SURFACE AB SERPL IA-ACNC: REACTIVE M[IU]/ML
HCG UR QL: NEGATIVE

## 2024-07-23 PROCEDURE — 81025 URINE PREGNANCY TEST: CPT | Performed by: FAMILY MEDICINE

## 2024-07-23 PROCEDURE — 86787 VARICELLA-ZOSTER ANTIBODY: CPT | Performed by: FAMILY MEDICINE

## 2024-07-23 PROCEDURE — 86317 IMMUNOASSAY INFECTIOUS AGENT: CPT | Mod: 90 | Performed by: FAMILY MEDICINE

## 2024-07-23 PROCEDURE — 86765 RUBEOLA ANTIBODY: CPT | Performed by: FAMILY MEDICINE

## 2024-07-23 PROCEDURE — 96372 THER/PROPH/DIAG INJ SC/IM: CPT | Mod: 59 | Performed by: FAMILY MEDICINE

## 2024-07-23 PROCEDURE — 11982 REMOVE DRUG IMPLANT DEVICE: CPT | Performed by: FAMILY MEDICINE

## 2024-07-23 PROCEDURE — 86706 HEP B SURFACE ANTIBODY: CPT | Performed by: FAMILY MEDICINE

## 2024-07-23 PROCEDURE — 99000 SPECIMEN HANDLING OFFICE-LAB: CPT | Performed by: FAMILY MEDICINE

## 2024-07-23 PROCEDURE — 86708 HEPATITIS A ANTIBODY: CPT | Performed by: FAMILY MEDICINE

## 2024-07-23 PROCEDURE — 99214 OFFICE O/P EST MOD 30 MIN: CPT | Mod: 25 | Performed by: FAMILY MEDICINE

## 2024-07-23 PROCEDURE — 86762 RUBELLA ANTIBODY: CPT | Performed by: FAMILY MEDICINE

## 2024-07-23 PROCEDURE — 86735 MUMPS ANTIBODY: CPT | Mod: 59 | Performed by: FAMILY MEDICINE

## 2024-07-23 PROCEDURE — 36415 COLL VENOUS BLD VENIPUNCTURE: CPT | Performed by: FAMILY MEDICINE

## 2024-07-23 RX ORDER — LIDOCAINE HYDROCHLORIDE AND EPINEPHRINE 10; 10 MG/ML; UG/ML
1 INJECTION, SOLUTION INFILTRATION; PERINEURAL ONCE
Status: COMPLETED | OUTPATIENT
Start: 2024-07-23 | End: 2024-07-23

## 2024-07-23 RX ORDER — MECLIZINE HYDROCHLORIDE 25 MG/1
25 TABLET ORAL 3 TIMES DAILY PRN
Qty: 30 TABLET | Refills: 5 | Status: SHIPPED | OUTPATIENT
Start: 2024-07-23

## 2024-07-23 RX ORDER — MEDROXYPROGESTERONE ACETATE 150 MG/ML
150 INJECTION, SUSPENSION INTRAMUSCULAR
Status: ACTIVE | OUTPATIENT
Start: 2024-07-23 | End: 2025-07-18

## 2024-07-23 RX ORDER — MULTIVITAMIN
1 TABLET ORAL DAILY
Qty: 100 TABLET | Refills: 3 | Status: SHIPPED | OUTPATIENT
Start: 2024-07-23

## 2024-07-23 RX ORDER — PANTOPRAZOLE SODIUM 40 MG/1
40 TABLET, DELAYED RELEASE ORAL DAILY
Qty: 90 TABLET | Refills: 2 | Status: SHIPPED | OUTPATIENT
Start: 2024-07-23

## 2024-07-23 RX ORDER — HYDROXYZINE HYDROCHLORIDE 10 MG/1
10-20 TABLET, FILM COATED ORAL EVERY 8 HOURS PRN
Qty: 60 TABLET | Refills: 5 | Status: SHIPPED | OUTPATIENT
Start: 2024-07-23

## 2024-07-23 RX ADMIN — MEDROXYPROGESTERONE ACETATE 150 MG: 150 INJECTION, SUSPENSION INTRAMUSCULAR at 10:22

## 2024-07-23 RX ADMIN — LIDOCAINE HYDROCHLORIDE AND EPINEPHRINE 1 ML: 10; 10 INJECTION, SOLUTION INFILTRATION; PERINEURAL at 10:00

## 2024-07-23 NOTE — PATIENT INSTRUCTIONS
-Thank you for choosing the [facility].  -It was a pleasure to see you today.  -Please take a look at the information below for more specific details regarding the treatment plan and recommendations.  -In this after visit summary is a list of your medications and specific instructions.  Please review this carefully as there may be changes made to your medication list.  -If there are any particular questions or concerns, please feel free to reach out to Dr. Harper.  -If any labs have been completed, we will reach out to you about results.  If the results are normal or not concerning, a letter or [employer] message will be sent to you.  If any follow-up is needed, either Dr. Harper or the nurse will give you a call.  If you have not heard regarding results after 2 weeks, please reach out to the clinic.    Patient Instructions:    -The Nexplanon was removed.    -Depo-provera was given.  -Avoid unprotected sex for the next 7 days to allow for the new contraception medication to take full effect.  Having unprotected sex prior to that point could potentially result in a pregnancy.    -Avoid putting the wound in standing water (such as baths, streams, lakes, pools, etc.) for at least the next 7 days.  -Change the wound dressing daily as needed.  -When changing wound dressing, apply Vaseline (or an antibiotic ointment) to the wound to protect and help the wound heal.  -You may use Tylenol and/or ibuprofen as needed for discomforts.    -Dr. Harper recommends that you quit smoking tobacco.     Please seek immediate medical attention (go to the emergency room or urgent care) for the following reasons: worsening symptoms, redness, swelling, pus, discharge, fevers, chills, feeling ill, or any concerning changes.    Please return to clinic as needed.      --------------------------------------------------------------------------------------------------------------------    -We are always looking for ways to improve.   You may be selected to receive a survey regarding your visit today.  We encourage you to complete the survey and provide specific, constructive feedback to help us improve our processes.  Thank you for your time!  -Please review the contact information listed on the after visit summary and in the electronic chart.  Below is the phone number that we have on file.  If there are any changes that are needed to be made, please reach out to the clinic.  [phone] (home)

## 2024-07-23 NOTE — PROGRESS NOTES
OFFICE VISIT    Assessment/Plan:     Patient Instructions:    -The Nexplanon was removed.    -Depo-provera was given.  -Avoid unprotected sex for the next 7 days to allow for the new contraception medication to take full effect.  Having unprotected sex prior to that point could potentially result in a pregnancy.    -Avoid putting the wound in standing water (such as baths, streams, lakes, pools, etc.) for at least the next 7 days.  -Change the wound dressing daily as needed.  -When changing wound dressing, apply Vaseline (or an antibiotic ointment) to the wound to protect and help the wound heal.  -You may use Tylenol and/or ibuprofen as needed for discomforts.    -Dr. Harper recommends that you quit smoking tobacco.   -Take the medications as prescribed.    Please seek immediate medical attention (go to the emergency room or urgent care) for the following reasons: worsening symptoms, redness, swelling, pus, discharge, fevers, chills, feeling ill, or any concerning changes.    Please return to clinic as needed.      Say was seen today for procedure and contraception.  Diagnoses and all orders for this visit:    Encounter for counseling regarding contraception  -     HCG qualitative urine; Future  -     medroxyPROGESTERone (DEPO-PROVERA) injection 150 mg  -     HCG qualitative urine    Encounter for Nexplanon removal  -     REMOVAL NEXPLANON  -     lidocaine 1% with EPINEPHrine 1:100,000 injection 1 mL    Epigastric burning sensation: hx of similar year ago, no pain with palpation but pt feeling the burning, only takes pantoprazole prn, will resume daily dosing which resolved symptoms in past.  Orders:  -     pantoprazole (PROTONIX) 40 MG EC tablet; Take 1 tablet (40 mg) by mouth daily    Dizziness: Stable.  Refill given as below.  -     meclizine (ANTIVERT) 25 MG tablet; Take 1 tablet (25 mg) by mouth 3 times daily as needed for dizziness    Anxiety: Stable.  Refill given as below.  -     hydrOXYzine HCl (ATARAX) 10  "MG tablet; Take 1-2 tablets (10-20 mg) by mouth every 8 hours as needed for itching or anxiety (sleep)    Dermatitis  Dermatographia: note on exam of rash on back.  Stable today.  Refill given as below.  Orders:  -     hydrOXYzine HCl (ATARAX) 10 MG tablet; Take 1-2 tablets (10-20 mg) by mouth every 8 hours as needed for itching or anxiety (sleep)    Immunity status testing  -     Mumps Immune Status, IgG  -     Rubella Antibody IgG; Future  -     Rubeola Antibody IgG; Future  -     Varicella Zoster Virus Antibody IgG; Future  -     Hepatitis B Surface Antibody; Future  -     Diphtheria tetanus antibody panel; Future  -     Hepatitis A Antibody Total; Future    Low energy  -     multivitamin (ONE-DAILY) tablet; Take 1 tablet by mouth daily        Return in about 3 months (around 10/23/2024) for For Depo-Provera shot.    The diagnoses, treatment options, risk, benefits, and recommendations were reviewed with patient/guardian.  Questions were answered to patient's/guardian satisfaction.  Red flag signs were reviewed.  Patient/guardian is in agreement with above plan.      Subjective: 39 year old female with history of anxiety, epigastric burning sensation, dizziness, tobacco use disorder who presents to clinic for the following complaints:   Patient presents with:  Procedure: Last nexplanon insertion was 2021  Contraception: Want to start on depo    Nexplanon had been placed in 2021 by unknown provider in the LEFT upper arm. Patient is due for removal at this time. She had \"lot of health issues\" when she was on the nexplanon, though is not sure how to explain it. Sometimes, she feels tightness from her heart and she wants to see if there are medications she can get to see if it helps. The chest tightness is noted since 2021. She gets the episodes when she is not doing anything. Exertion doesn't cause symptoms.  Patient would like to use the Depo shot going forward.  Discussed treatment options, risk and benefits.  " Questions answered.      No records received from previous clinic.    PROCEDURE NOTE     Procedure: Nexplanon removal (LEFT medial upper arm)  Diagnosis: Encounter for Nexplanon removal     Diagnosis, treatment options and recommendations were reviewed with patient.  A timeout was completed.  Patient was identified and the site location confirmed.  Findings are described in the physical exam section.  Questions were answered to satisfaction.  After discussion, patient would like to complete procedure as listed above. Patient was placed in the supine position and the area was anesthetized using a 25g needle with 1% lidocaine with epinephrine.  A total of 1 mL was utilized for the procedure. The area was prepped in a sterile fashion using betadine swabs. An incision (about 4 mm transverse to the nexplanon) was made in the skin at the distal portion of the Nexplanon site with a #11 blade.  The Nexplanon was identified and removed in its entirety without complication.  On exploration of the area, there does not appear to be any residual foreign body noted.  Hemostasis was obtained.  Steri-Strips were applied with benzoin tincture.  Pressure gauze dressing was applied. Patient tolerated procedure well without any complications.  Home cares were reviewed.    Blood loss: Minimal  Specimen: Nexplanon        HM due was reviewed with patient/parent.  Recommendations, risk, benefits were reviewed.  Accepted recommendations were ordered.  Otherwise, patient/parent declined.    Health Maintenance Due   Topic Date Due    ADVANCE CARE PLANNING  Never done    MAMMO SCREENING  Never done    HEPATITIS B IMMUNIZATION (1 of 3 - 19+ 3-dose series) Never done    DTAP/TDAP/TD IMMUNIZATION (1 - Tdap) Never done    COVID-19 Vaccine (1 - 2023-24 season) Never done     Discussed immunity testing.  UPT negative today.   The patient is requesting for medication that could help with energy. Plan to start a multivitamin at this time. Labs  "reassuring from 04/18/2024.  Patient requesting refills for any medications.  Doing well without issues or side effects.  Underlying conditions are stable, though will wax and wane.      There is no immunization history on file for this patient.    Currently, there are studies suggesting that nexplanon can remain effective for up to 5 years. Check UPT today and if negative, okay to proceed with depo-provera.    A professional [employer] telephone  was utilized for the office visit.     The 10 point review of system is negative except as stated in the HPI.    Allergies were reviewed and updated.    Objective:   /71   Pulse 73   Temp 98.5  F (36.9  C) (Oral)   Resp 16   Ht 1.544 m (5' 0.8\")   Wt 63.2 kg (139 lb 4 oz)   LMP 07/10/2024 (Exact Date)   SpO2 100%   BMI 26.48 kg/m    General: Active, alert, nontoxic-appearing.  No acute distress.  Normal ambulation.  No dizziness with position changes.  HEENT: Normocephalic, atraumatic.  Pupils are equal and round.  Sclera is clear.  Normal external ears. Nares patent.  Moist mucous membranes.    Cardiac: RRR.  S1, S2 present.  No murmurs, rubs, or gallops.  Respiratory/chest: Clear to auscultation bilaterally.  No wheezes, rales, rhonchi.  Breathing is not labored.  No accessory muscle usage.  Extremities: LEFT upper extremity: About 8-10 cm proximal to the medial epicondyles, there is a subcutaneous, tubular mass that is about 2mm x 4 cm.  Overlying skin is normal-appearing.  Voluntary movements intact.    Integumentary: No concerning rash or skin changes appreciated.        Abisai Harper MD  [facility] [place] [address]  Phone: [phone]  Fax: [phone]    7/25/2024  8:10 AM          Current Outpatient Medications   Medication Sig Dispense Refill    acetaminophen (TYLENOL) 500 MG tablet Take 2 tablets (1,000 mg) by mouth 3 times daily as needed for mild pain 100 tablet 3    diclofenac (VOLTAREN) 1 % " topical gel Apply 4 g topically 4 times daily as needed (leg pain) 350 g 3    hydrOXYzine HCl (ATARAX) 10 MG tablet Take 1-2 tablets (10-20 mg) by mouth every 8 hours as needed for itching or anxiety (sleep) 60 tablet 5    meclizine (ANTIVERT) 25 MG tablet Take 1 tablet (25 mg) by mouth 3 times daily as needed for dizziness 30 tablet 5    multivitamin (ONE-DAILY) tablet Take 1 tablet by mouth daily 100 tablet 3    pantoprazole (PROTONIX) 40 MG EC tablet Take 1 tablet (40 mg) by mouth daily 90 tablet 2    triamcinolone (KENALOG) 0.1 % external cream Apply topically 2 times daily DO NOT USE ON FACE 80 g 0     Current Facility-Administered Medications   Medication Dose Route Frequency Provider Last Rate Last Admin    lidocaine 1% with EPINEPHrine 1:100,000 injection 1 mL  1 mL Intradermal Once         medroxyPROGESTERone (DEPO-PROVERA) injection 150 mg  150 mg Intramuscular Q90 Days            No Known Allergies    Patient Active Problem List    Diagnosis Date Noted    Anxiety 04/22/2024     Priority: Medium    Epigastric burning sensation 04/22/2024     Priority: Medium    Dizziness 04/22/2024     Priority: Medium    Tobacco use disorder 04/22/2024     Priority: Medium       Family History   Problem Relation Age of Onset    Breast Cancer No family hx of     Coronary Artery Disease No family hx of     Diabetes No family hx of     Hypertension No family hx of        Past Surgical History:   Procedure Laterality Date    C/SECTION, LOW TRANSVERSE          Social History     Socioeconomic History    Marital status:      Spouse name: Not on file    Number of children: Not on file    Years of education: Not on file    Highest education level: Not on file   Occupational History    Not on file   Tobacco Use    Smoking status: Former     Types: Cigarettes     Passive exposure: Current    Smokeless tobacco: Never   Vaping Use    Vaping status: Never Used   Substance and Sexual Activity    Alcohol use: Not on file    Drug  use: Not on file    Sexual activity: Not on file   Other Topics Concern    Not on file   Social History Narrative    Not on file     Social Determinants of Health     Financial Resource Strain: Low Risk  (4/17/2024)    Financial Resource Strain     Within the past 12 months, have you or your family members you live with been unable to get utilities (heat, electricity) when it was really needed?: No   Food Insecurity: Low Risk  (4/17/2024)    Food Insecurity     Within the past 12 months, did you worry that your food would run out before you got money to buy more?: No     Within the past 12 months, did the food you bought just not last and you didn t have money to get more?: No   Transportation Needs: Low Risk  (4/17/2024)    Transportation Needs     Within the past 12 months, has lack of transportation kept you from medical appointments, getting your medicines, non-medical meetings or appointments, work, or from getting things that you need?: No   Physical Activity: Insufficiently Active (4/17/2024)    Exercise Vital Sign     Days of Exercise per Week: 2 days     Minutes of Exercise per Session: 20 min   Stress: No Stress Concern Present (4/17/2024)    [employer] - Occupational Stress Questionnaire     Feeling of Stress : Only a little   Social Connections: Unknown (4/17/2024)    Social Connection and Isolation Panel [NHANES]     Frequency of Communication with Friends and Family: Not on file     Frequency of Social Gatherings with Friends and Family: Never     Attends Restorationism Services: Not on file     Active Member of Clubs or Organizations: Not on file     Attends Club or Organization Meetings: Not on file     Marital Status: Not on file   Interpersonal Safety: Low Risk  (4/18/2024)    Interpersonal Safety     Do you feel physically and emotionally safe where you currently live?: Yes     Within the past 12 months, have you been hit, slapped, kicked or otherwise physically hurt by  someone?: No     Within the past 12 months, have you been humiliated or emotionally abused in other ways by your partner or ex-partner?: No   Housing Stability: Low Risk  (4/17/2024)    Housing Stability     Do you have housing? : Yes     Are you worried about losing your housing?: No

## 2024-07-24 LAB
MEV IGG SER IA-ACNC: >300 AU/ML
MEV IGG SER IA-ACNC: POSITIVE
MUMPS ANTIBODY IGG INSTRUMENT VALUE: 12.6 AU/ML
MUV IGG SER QL IA: POSITIVE
RUBV IGG SERPL QL IA: 1.1 INDEX
RUBV IGG SERPL QL IA: POSITIVE
VZV IGG SER QL IA: 333.5 INDEX
VZV IGG SER QL IA: POSITIVE

## 2024-07-25 ENCOUNTER — TELEPHONE (OUTPATIENT)
Dept: FAMILY MEDICINE | Facility: CLINIC | Age: 39
End: 2024-07-25
Payer: COMMERCIAL

## 2024-07-25 PROBLEM — Z30.09 ENCOUNTER FOR COUNSELING REGARDING CONTRACEPTION: Status: ACTIVE | Noted: 2024-07-25

## 2024-07-25 PROBLEM — R53.83 LOW ENERGY: Status: ACTIVE | Noted: 2024-07-25

## 2024-07-25 PROBLEM — L50.3 DERMATOGRAPHIA: Status: ACTIVE | Noted: 2024-07-25

## 2024-07-25 LAB
C DIPHTHERIAE IGG SER-ACNC: 0.7 IU/ML
C TETANI TOXOID IGG SERPL IA-ACNC: 3.9 IU/ML

## 2024-07-25 NOTE — TELEPHONE ENCOUNTER
----- Message from Abisai Harper sent at 7/25/2024  5:40 PM CDT -----  Team - please call patient with results.    Hello Say Hiwot Gross,    I hope you have been well since our last visit. Below are the results from the testing completed at the visit.     The testing shows that your immunizations are up-to-date.    Dr. Harper recommends that you continue on the plan as discussed in clinic.    If there are any questions or concerns, please call the clinic or schedule an appointment for follow up.     Best wishes,           Abisai Haprer MD  [facility]  7/25/2024  5:38 PM

## 2024-07-25 NOTE — TELEPHONE ENCOUNTER
Called patient and relayed the following message to the patient. Patient understood the message and did not have any further questions or concerns.

## 2024-10-22 ENCOUNTER — ALLIED HEALTH/NURSE VISIT (OUTPATIENT)
Dept: FAMILY MEDICINE | Facility: CLINIC | Age: 39
End: 2024-10-22
Payer: COMMERCIAL

## 2024-10-22 DIAGNOSIS — Z30.42 DEPO-PROVERA CONTRACEPTIVE STATUS: Primary | ICD-10-CM

## 2024-10-22 PROCEDURE — 96372 THER/PROPH/DIAG INJ SC/IM: CPT | Performed by: FAMILY MEDICINE

## 2024-10-22 PROCEDURE — 99207 PR NO CHARGE NURSE ONLY: CPT

## 2024-10-22 RX ADMIN — MEDROXYPROGESTERONE ACETATE 150 MG: 150 INJECTION, SUSPENSION INTRAMUSCULAR at 10:24

## 2024-10-22 NOTE — PROGRESS NOTES
Clinic Administered Medication Documentation      Depo Provera Documentation    Depo-Provera Standing Order inclusion/exclusion criteria reviewed.     Is this the initial or subsequent dose of Depo Provera? Subsequent dose - patient is within the acceptable window of time (11-15 weeks) for subsequent injection. Pregnancy test not indicated.    Patient meets: inclusion criteria     Is there an active order (written within the past 365 days, with administrations remaining, not ) in the chart? Yes.     Prior to injection, verified patient identity using patient's name and date of birth. Medication was administered. Please see MAR and medication order for additional information.     Vial/Syringe: Single dose vial. Was entire vial of medication used? Yes    Patient instructed to remain in clinic for 15 minutes.  NEXT INJECTION DUE: 25 - 25    Verified that the patient has refills remaining in their prescription.

## 2025-01-20 ENCOUNTER — ALLIED HEALTH/NURSE VISIT (OUTPATIENT)
Dept: FAMILY MEDICINE | Facility: CLINIC | Age: 40
End: 2025-01-20
Payer: COMMERCIAL

## 2025-01-20 DIAGNOSIS — R10.13 EPIGASTRIC BURNING SENSATION: ICD-10-CM

## 2025-01-20 DIAGNOSIS — L50.3 DERMATOGRAPHIA: ICD-10-CM

## 2025-01-20 DIAGNOSIS — F41.9 ANXIETY: ICD-10-CM

## 2025-01-20 DIAGNOSIS — Z30.42 DEPO-PROVERA CONTRACEPTIVE STATUS: Primary | ICD-10-CM

## 2025-01-20 DIAGNOSIS — L30.9 DERMATITIS: ICD-10-CM

## 2025-01-20 PROCEDURE — 99207 PR NO CHARGE NURSE ONLY: CPT

## 2025-01-20 PROCEDURE — 96372 THER/PROPH/DIAG INJ SC/IM: CPT | Performed by: FAMILY MEDICINE

## 2025-01-20 RX ADMIN — MEDROXYPROGESTERONE ACETATE 150 MG: 150 INJECTION, SUSPENSION INTRAMUSCULAR at 10:35

## 2025-01-20 NOTE — PROGRESS NOTES

## 2025-01-22 RX ORDER — HYDROXYZINE HYDROCHLORIDE 10 MG/1
10-20 TABLET, FILM COATED ORAL EVERY 8 HOURS PRN
Qty: 60 TABLET | Refills: 5 | Status: SHIPPED | OUTPATIENT
Start: 2025-01-22

## 2025-01-22 RX ORDER — PANTOPRAZOLE SODIUM 40 MG/1
40 TABLET, DELAYED RELEASE ORAL DAILY
Qty: 90 TABLET | Refills: 2 | Status: SHIPPED | OUTPATIENT
Start: 2025-01-22

## 2025-02-14 ENCOUNTER — ANCILLARY PROCEDURE (OUTPATIENT)
Dept: GENERAL RADIOLOGY | Facility: CLINIC | Age: 40
End: 2025-02-14
Payer: COMMERCIAL

## 2025-02-14 DIAGNOSIS — R07.9 CHEST PAIN, UNSPECIFIED TYPE: ICD-10-CM

## 2025-02-14 DIAGNOSIS — K21.00 GASTROESOPHAGEAL REFLUX DISEASE WITH ESOPHAGITIS, UNSPECIFIED WHETHER HEMORRHAGE: ICD-10-CM

## 2025-02-14 PROCEDURE — 71046 X-RAY EXAM CHEST 2 VIEWS: CPT | Mod: TC | Performed by: RADIOLOGY

## 2025-03-03 ENCOUNTER — OFFICE VISIT (OUTPATIENT)
Dept: FAMILY MEDICINE | Facility: CLINIC | Age: 40
End: 2025-03-03
Payer: COMMERCIAL

## 2025-03-03 VITALS
RESPIRATION RATE: 14 BRPM | WEIGHT: 160 LBS | HEIGHT: 60 IN | DIASTOLIC BLOOD PRESSURE: 60 MMHG | HEART RATE: 85 BPM | OXYGEN SATURATION: 99 % | SYSTOLIC BLOOD PRESSURE: 100 MMHG | BODY MASS INDEX: 31.41 KG/M2 | TEMPERATURE: 98.3 F

## 2025-03-03 DIAGNOSIS — L30.9 DERMATITIS: ICD-10-CM

## 2025-03-03 DIAGNOSIS — E87.6 HYPOKALEMIA: ICD-10-CM

## 2025-03-03 DIAGNOSIS — R10.13 EPIGASTRIC PAIN: Primary | ICD-10-CM

## 2025-03-03 DIAGNOSIS — D72.829 LEUKOCYTOSIS, UNSPECIFIED TYPE: ICD-10-CM

## 2025-03-03 DIAGNOSIS — F41.9 ANXIETY: ICD-10-CM

## 2025-03-03 DIAGNOSIS — K21.9 GASTROESOPHAGEAL REFLUX DISEASE, UNSPECIFIED WHETHER ESOPHAGITIS PRESENT: ICD-10-CM

## 2025-03-03 DIAGNOSIS — L50.3 DERMATOGRAPHIA: ICD-10-CM

## 2025-03-03 LAB
BASOPHILS # BLD AUTO: 0 10E3/UL (ref 0–0.2)
BASOPHILS NFR BLD AUTO: 0 %
EOSINOPHIL # BLD AUTO: 0.1 10E3/UL (ref 0–0.7)
EOSINOPHIL NFR BLD AUTO: 1 %
ERYTHROCYTE [DISTWIDTH] IN BLOOD BY AUTOMATED COUNT: 14 % (ref 10–15)
HCT VFR BLD AUTO: 40.5 % (ref 35–47)
HGB BLD-MCNC: 14 G/DL (ref 11.7–15.7)
IMM GRANULOCYTES # BLD: 0 10E3/UL
IMM GRANULOCYTES NFR BLD: 0 %
LYMPHOCYTES # BLD AUTO: 2.3 10E3/UL (ref 0.8–5.3)
LYMPHOCYTES NFR BLD AUTO: 22 %
MCH RBC QN AUTO: 30.4 PG (ref 26.5–33)
MCHC RBC AUTO-ENTMCNC: 34.6 G/DL (ref 31.5–36.5)
MCV RBC AUTO: 88 FL (ref 78–100)
MONOCYTES # BLD AUTO: 0.7 10E3/UL (ref 0–1.3)
MONOCYTES NFR BLD AUTO: 7 %
NEUTROPHILS # BLD AUTO: 7.2 10E3/UL (ref 1.6–8.3)
NEUTROPHILS NFR BLD AUTO: 70 %
PLATELET # BLD AUTO: 208 10E3/UL (ref 150–450)
RBC # BLD AUTO: 4.61 10E6/UL (ref 3.8–5.2)
WBC # BLD AUTO: 10.4 10E3/UL (ref 4–11)

## 2025-03-03 PROCEDURE — 36415 COLL VENOUS BLD VENIPUNCTURE: CPT | Performed by: FAMILY MEDICINE

## 2025-03-03 PROCEDURE — 99214 OFFICE O/P EST MOD 30 MIN: CPT | Performed by: FAMILY MEDICINE

## 2025-03-03 PROCEDURE — 82248 BILIRUBIN DIRECT: CPT | Performed by: FAMILY MEDICINE

## 2025-03-03 PROCEDURE — 83690 ASSAY OF LIPASE: CPT | Performed by: FAMILY MEDICINE

## 2025-03-03 PROCEDURE — 85025 COMPLETE CBC W/AUTO DIFF WBC: CPT | Performed by: FAMILY MEDICINE

## 2025-03-03 PROCEDURE — 3074F SYST BP LT 130 MM HG: CPT | Performed by: FAMILY MEDICINE

## 2025-03-03 PROCEDURE — G2211 COMPLEX E/M VISIT ADD ON: HCPCS | Performed by: FAMILY MEDICINE

## 2025-03-03 PROCEDURE — 80053 COMPREHEN METABOLIC PANEL: CPT | Performed by: FAMILY MEDICINE

## 2025-03-03 PROCEDURE — T1013 SIGN LANG/ORAL INTERPRETER: HCPCS | Performed by: INTERPRETER

## 2025-03-03 PROCEDURE — 3078F DIAST BP <80 MM HG: CPT | Performed by: FAMILY MEDICINE

## 2025-03-03 PROCEDURE — 84443 ASSAY THYROID STIM HORMONE: CPT | Performed by: FAMILY MEDICINE

## 2025-03-03 RX ORDER — HYDROXYZINE HYDROCHLORIDE 10 MG/1
10-20 TABLET, FILM COATED ORAL EVERY 8 HOURS PRN
Qty: 60 TABLET | Refills: 5 | Status: SHIPPED | OUTPATIENT
Start: 2025-03-03

## 2025-03-03 NOTE — PROGRESS NOTES
"OFFICE VISIT    Assessment/Plan:     Patient Instructions:    -Complete the tests as ordered.   -Further recommendations will depend on results.   -Continue medications as prescribed.     Recommendations to help control reflux/GERD:     Lifestyle changes  Limit or avoid fatty, fried, and spicy foods, as well as coffee, chocolate, mint, and foods with high acid content such as tomatoes and citrus fruit and juices (orange, grapefruit, lemon).  Don t eat large meals, especially at night. Frequent, smaller meals are best. Don't lie down right after eating.  Try to sit up for at least 2 hours after eating.  And don t eat anything 3 hours before going to bed.  Don't drink alcohol or smoke. As much as possible, stay away from second hand smoke.  If you are overweight, losing weight will reduce symptoms.   Don't wear tight clothing around your stomach area.  If your symptoms occur during sleep, use a foam wedge to elevate your upper body (not just your head.) Or, place 4\" blocks under the head of your bed. Or use 2 bed risers under your bedframe.      When to seek medical advice  Call your healthcare provider if any of the following occur:  Stomach pain gets worse or moves to the lower right abdomen (appendix area)  Chest pain appears or gets worse, or spreads to the back, neck, shoulder, or arm  An over-the-counter trial of medicine doesn't relieve your symptoms  Weight loss that can't be explained  Trouble or pain swallowing  Frequent vomiting (can t keep down liquids)  Blood in the stool or vomit (red or black in color)  Feeling weak or dizzy  Fever of 100.4 F (38 C) or higher, or as directed by your healthcare provider      Please seek immediate medical attention (go to the emergency room or urgent care) for the following reasons: worsening symptoms, or any concerning changes.        Say was seen today for follow up and generalized body aches.  Diagnoses and all orders for this visit:    Epigastric " pain  Gastroesophageal reflux disease, unspecified whether esophagitis present  Hypokalemia  Leukocytosis, unspecified type  Language barrier: Discomfort is noted primarily on the epigastric region.  Liver edge appears normal on exam.  Check labs as below.  Further recommendations pending results.  -     Helicobacter pylori Antigen Stool; Future  -     Basic metabolic panel; Future  -     CBC with Platelets & Differential; Future  -     Hepatic function panel; Future  -     Lipase; Future  -     TSH with free T4 reflex; Future    Anxiety  Dermatitis  Comments:  back  Dermatographia: Plan to restart medication as below.  Reviewed appropriate usage/indications for the medication.  Patient will  and try the medication.  Orders:  -     hydrOXYzine HCl (ATARAX) 10 MG tablet; Take 1-2 tablets (10-20 mg) by mouth every 8 hours as needed for itching or anxiety (sleep).        Return if symptoms worsen or fail to improve.    The diagnoses, treatment options, risk, benefits, and recommendations were reviewed with patient/guardian.  Questions were answered to patient's/guardian satisfaction.  Red flag signs were reviewed.  Patient/guardian is in agreement with above plan.      Subjective: 40 year old female with history of anxiety, epigastric burning sensation, dizziness, tobacco use disorder who presents to clinic for the following complaints:   Patient presents with:  Follow Up: Visit from 2/14/2025  Generalized Body Aches    Answers submitted by the patient for this visit:  General Questionnaire (Submitted on 3/3/2025)  Chief Complaint: Chronic problems general questions HPI Form  General Concern (Submitted on 3/3/2025)  Chief Complaint: Chronic problems general questions HPI Form  What is the reason for your visit today?: achey all over  Questionnaire about: Chronic problems general questions HPI Form (Submitted on 3/3/2025)  Chief Complaint: Chronic problems general questions HPI Form      The patient was seen in  "urgent care on 02/14/2025, patient was seen for chest pain.  It is reported that this is chronic with acute episode.  After evaluation, symptoms seem to be more indicative of epigastric pain with occasional palpitations and shortness of breath.  EKG was stable.  Lab testing demonstrated WBC 11.7 otherwise normal CBC.  CMP demonstrated potassium 3.1, carbon oxide 21, anion gap 16 otherwise normal CMP.  Chest x-ray was not concerning.  Patient diagnosed with GERD with esophagitis (unspecified whether hemorrhage).  Initiated on pantoprazole 40 mg once daily.  Patient also noting itchy eyes that has been chronic and given Patanol 0.1% ophthalmic solution.    Since then, the epigastric/chest pain has been about the same as before. When she has the symptoms, she notices as if the body is getting really tense or tender. It feels like her body is a \"balloon full of air.\" A lot of time, the pain comes on with burning that can radiate to the back. Sometimes, it is difficult to sleep on the side as she has discomforts in the belly in this position.     When she watches Facebook and sees bad news, she sometimes can have anxiety with heart racing/pounding. She had been prescribed hydroxyzine 10mg tablets, but she doesn't have it and would like to retry the medication.     Denies any tick or insect bites.  Denies recent travels, fevers, chills, nausea, vomiting, coughing, shortness of breath, wheezing, feeling ill, or other significant changes from baseline.    A professional [employer] telephone  was utilized for the office visit.     The 10 point review of system is negative except as stated in the HPI.    Allergies were reviewed and updated.      EXAM: XR CHEST 2 VIEWS  LOCATION: [facility]  DATE: 2/14/2025    INDICATION: Chest pain, unspecified type, Gastroesophageal reflux disease with esophagitis, unspecified whether hemorrhage  COMPARISON: None.      IMPRESSION: Negative chest. "       Objective:   /60   Pulse 85   Temp 98.3  F (36.8  C) (Oral)   Resp 14   Ht 1.524 m (5')   Wt 72.6 kg (160 lb)   LMP  (LMP Unknown)   SpO2 99%   BMI 31.25 kg/m    General: Active, alert, nontoxic-appearing.  No acute distress.  HEENT: Normocephalic, atraumatic.  Pupils are equal and round.  Sclera is clear.  Normal external ears. Nares patent.  Moist mucous membranes.    Cardiac: RRR.  S1, S2 present.  No murmurs, rubs, or gallops.  Respiratory/chest: Clear to auscultation bilaterally.  No wheezes, rales, rhonchi.  Breathing is not labored.  No accessory muscle usage.  Back: No CVA tenderness to percussion.   Abdomen: epigastric discomforts to palpation.  Abdomen is otherwise soft, nondistended, nontender.  No masses or organomegaly noted.  No guarding or rebound tenderness appreciated.  Extremities: Voluntary movements intact.  Integumentary: No concerning rash or skin changes appreciated.        Abisai Harper MD  [facility] [place] [address]  Phone: [phone]  Fax: [phone]    3/6/2025  7:33 AM            Current Outpatient Medications   Medication Sig Dispense Refill    hydrOXYzine HCl (ATARAX) 10 MG tablet Take 1-2 tablets (10-20 mg) by mouth every 8 hours as needed for itching or anxiety (sleep). 60 tablet 5    acetaminophen (TYLENOL) 500 MG tablet Take 2 tablets (1,000 mg) by mouth 3 times daily as needed for mild pain 100 tablet 3    diclofenac (VOLTAREN) 1 % topical gel Apply 4 g topically 4 times daily as needed (leg pain) 350 g 3    meclizine (ANTIVERT) 25 MG tablet Take 1 tablet (25 mg) by mouth 3 times daily as needed for dizziness 30 tablet 5    multivitamin (ONE-DAILY) tablet Take 1 tablet by mouth daily 100 tablet 3    olopatadine (PATANOL) 0.1 % ophthalmic solution Place 1 drop into both eyes 2 times daily. 5 mL 0    pantoprazole (PROTONIX) 40 MG EC tablet Take 1 tablet (40 mg) by mouth daily. 90 tablet 2    triamcinolone (KENALOG) 0.1 %  external cream Apply topically 2 times daily DO NOT USE ON FACE 80 g 0     Current Facility-Administered Medications   Medication Dose Route Frequency Provider Last Rate Last Admin    medroxyPROGESTERone (DEPO-PROVERA) injection 150 mg  150 mg Intramuscular Q90 Days    150 mg at 01/20/25 1035       No Known Allergies    Patient Active Problem List    Diagnosis Date Noted    Encounter for counseling regarding contraception 07/25/2024     Priority: Medium    Dermatographia 07/25/2024     Priority: Medium     note on exam of rash on back      Low energy 07/25/2024     Priority: Medium    Anxiety 04/22/2024     Priority: Medium    Epigastric burning sensation 04/22/2024     Priority: Medium    Dizziness 04/22/2024     Priority: Medium    Tobacco use disorder 04/22/2024     Priority: Medium       Family History   Problem Relation Age of Onset    Breast Cancer No family hx of     Coronary Artery Disease No family hx of     Diabetes No family hx of     Hypertension No family hx of        Past Surgical History:   Procedure Laterality Date    C/SECTION, LOW TRANSVERSE          Social History     Socioeconomic History    Marital status:      Spouse name: Not on file    Number of children: Not on file    Years of education: Not on file    Highest education level: Not on file   Occupational History    Not on file   Tobacco Use    Smoking status: Former     Types: Cigarettes     Passive exposure: Current    Smokeless tobacco: Current     Types: Chew    Tobacco comments:     Chews betel nut    Vaping Use    Vaping status: Never Used   Substance and Sexual Activity    Alcohol use: Not on file    Drug use: Not on file    Sexual activity: Not on file   Other Topics Concern    Not on file   Social History Narrative    Not on file     Social Drivers of Health     Financial Resource Strain: Low Risk  (4/17/2024)    Financial Resource Strain     Within the past 12 months, have you or your family members you live with been unable  to get utilities (heat, electricity) when it was really needed?: No   Food Insecurity: Low Risk  (4/17/2024)    Food Insecurity     Within the past 12 months, did you worry that your food would run out before you got money to buy more?: No     Within the past 12 months, did the food you bought just not last and you didn t have money to get more?: No   Transportation Needs: Low Risk  (4/17/2024)    Transportation Needs     Within the past 12 months, has lack of transportation kept you from medical appointments, getting your medicines, non-medical meetings or appointments, work, or from getting things that you need?: No   Physical Activity: Insufficiently Active (4/17/2024)    Exercise Vital Sign     Days of Exercise per Week: 2 days     Minutes of Exercise per Session: 20 min   Stress: No Stress Concern Present (4/17/2024)    [employer] - Occupational Stress Questionnaire     Feeling of Stress : Only a little   Social Connections: Unknown (4/17/2024)    Social Connection and Isolation Panel [NHANES]     Frequency of Communication with Friends and Family: Not on file     Frequency of Social Gatherings with Friends and Family: Never     Attends Yarsani Services: Not on file     Active Member of Clubs or Organizations: Not on file     Attends Club or Organization Meetings: Not on file     Marital Status: Not on file   Interpersonal Safety: Low Risk  (2/14/2025)    Interpersonal Safety     Do you feel physically and emotionally safe where you currently live?: Yes     Within the past 12 months, have you been hit, slapped, kicked or otherwise physically hurt by someone?: No     Within the past 12 months, have you been humiliated or emotionally abused in other ways by your partner or ex-partner?: No   Housing Stability: Low Risk  (4/17/2024)    Housing Stability     Do you have housing? : Yes     Are you worried about losing your housing?: No

## 2025-03-03 NOTE — PATIENT INSTRUCTIONS
"-Thank you for choosing the [facility].  -It was a pleasure to see you today.  -Please take a look at the information below for more specific details regarding the treatment plan and recommendations.  -In this after visit summary is a list of your medications and specific instructions.  Please review this carefully as there may be changes made to your medication list.  -If there are any particular questions or concerns, please feel free to reach out to Dr. Harper.  -If any labs have been completed, we will reach out to you about results.  If the results are normal or not concerning, a letter or MyChart message will be sent to you.  If any follow-up is needed, either Dr. Harper or the nurse will give you a call.  If you have not heard regarding results after 2 weeks, please reach out to the clinic.    Patient Instructions:    -Complete the tests as ordered.   -Further recommendations will depend on results.   -Continue medications as prescribed.     Recommendations to help control reflux/GERD:     Lifestyle changes  Limit or avoid fatty, fried, and spicy foods, as well as coffee, chocolate, mint, and foods with high acid content such as tomatoes and citrus fruit and juices (orange, grapefruit, lemon).  Don t eat large meals, especially at night. Frequent, smaller meals are best. Don't lie down right after eating.  Try to sit up for at least 2 hours after eating.  And don t eat anything 3 hours before going to bed.  Don't drink alcohol or smoke. As much as possible, stay away from second hand smoke.  If you are overweight, losing weight will reduce symptoms.   Don't wear tight clothing around your stomach area.  If your symptoms occur during sleep, use a foam wedge to elevate your upper body (not just your head.) Or, place 4\" blocks under the head of your bed. Or use 2 bed risers under your bedframe.      When to seek medical advice  Call your healthcare provider if any of the following " occur:  Stomach pain gets worse or moves to the lower right abdomen (appendix area)  Chest pain appears or gets worse, or spreads to the back, neck, shoulder, or arm  An over-the-counter trial of medicine doesn't relieve your symptoms  Weight loss that can't be explained  Trouble or pain swallowing  Frequent vomiting (can t keep down liquids)  Blood in the stool or vomit (red or black in color)  Feeling weak or dizzy  Fever of 100.4 F (38 C) or higher, or as directed by your healthcare provider      Please seek immediate medical attention (go to the emergency room or urgent care) for the following reasons: worsening symptoms, or any concerning changes.      --------------------------------------------------------------------------------------------------------------------    -We are always looking for ways to improve.  You may be selected to receive a survey regarding your visit today.  We encourage you to complete the survey and provide specific, constructive feedback to help us improve our processes.  Thank you for your time!  -Please review the contact information listed on the after visit summary and in the electronic chart.  Below is the phone number that we have on file.  If there are any changes that are needed to be made, please reach out to the clinic.  [phone] (home)

## 2025-03-04 ENCOUNTER — APPOINTMENT (OUTPATIENT)
Dept: LAB | Facility: CLINIC | Age: 40
End: 2025-03-04
Payer: COMMERCIAL

## 2025-03-04 LAB
ALBUMIN SERPL BCG-MCNC: 4.1 G/DL (ref 3.5–5.2)
ALP SERPL-CCNC: 82 U/L (ref 40–150)
ALT SERPL W P-5'-P-CCNC: 20 U/L (ref 0–50)
ANION GAP SERPL CALCULATED.3IONS-SCNC: 10 MMOL/L (ref 7–15)
AST SERPL W P-5'-P-CCNC: 21 U/L (ref 0–45)
BILIRUB DIRECT SERPL-MCNC: 0.13 MG/DL (ref 0–0.3)
BILIRUB SERPL-MCNC: 0.3 MG/DL
BUN SERPL-MCNC: 14.7 MG/DL (ref 6–20)
CALCIUM SERPL-MCNC: 9.3 MG/DL (ref 8.8–10.4)
CHLORIDE SERPL-SCNC: 105 MMOL/L (ref 98–107)
CREAT SERPL-MCNC: 0.93 MG/DL (ref 0.51–0.95)
EGFRCR SERPLBLD CKD-EPI 2021: 78 ML/MIN/1.73M2
GLUCOSE SERPL-MCNC: 77 MG/DL (ref 70–99)
HCO3 SERPL-SCNC: 25 MMOL/L (ref 22–29)
LIPASE SERPL-CCNC: 50 U/L (ref 13–60)
POTASSIUM SERPL-SCNC: 3.6 MMOL/L (ref 3.4–5.3)
PROT SERPL-MCNC: 7.3 G/DL (ref 6.4–8.3)
SODIUM SERPL-SCNC: 140 MMOL/L (ref 135–145)
TSH SERPL DL<=0.005 MIU/L-ACNC: 0.83 UIU/ML (ref 0.3–4.2)

## 2025-03-04 PROCEDURE — 87338 HPYLORI STOOL AG IA: CPT | Performed by: FAMILY MEDICINE

## 2025-03-05 ENCOUNTER — TELEPHONE (OUTPATIENT)
Dept: FAMILY MEDICINE | Facility: CLINIC | Age: 40
End: 2025-03-05
Payer: COMMERCIAL

## 2025-03-05 LAB — H PYLORI AG STL QL IA: NEGATIVE

## 2025-03-05 NOTE — LETTER
March 5, 2025      Say Hiwot Renato  [address]   [place] [address]        Dear ,    We are writing to inform you of your test results.    Your lab results are normal.     Resulted Orders   Helicobacter pylori Antigen Stool   Result Value Ref Range    Helicobacter pylori Antigen Stool Negative Negative      Comment:      Negative for Helicobacter pylori antigen by enzyme immunoassay. A negative result indicates the absence of H. pylori antigen or that the level of antigen is below the level of detection.   Basic metabolic panel   Result Value Ref Range    Sodium 140 135 - 145 mmol/L    Potassium 3.6 3.4 - 5.3 mmol/L    Chloride 105 98 - 107 mmol/L    Carbon Dioxide (CO2) 25 22 - 29 mmol/L    Anion Gap 10 7 - 15 mmol/L    Urea Nitrogen 14.7 6.0 - 20.0 mg/dL    Creatinine 0.93 0.51 - 0.95 mg/dL    GFR Estimate 78 >60 mL/min/1.73m2      Comment:      eGFR calculated using 2021 CKD-EPI equation.    Calcium 9.3 8.8 - 10.4 mg/dL    Glucose 77 70 - 99 mg/dL   Hepatic function panel   Result Value Ref Range    Protein Total 7.3 6.4 - 8.3 g/dL    Albumin 4.1 3.5 - 5.2 g/dL    Bilirubin Total 0.3 <=1.2 mg/dL    Alkaline Phosphatase 82 40 - 150 U/L    AST 21 0 - 45 U/L    ALT 20 0 - 50 U/L    Bilirubin Direct 0.13 0.00 - 0.30 mg/dL   Lipase   Result Value Ref Range    Lipase 50 13 - 60 U/L   TSH with free T4 reflex   Result Value Ref Range    TSH 0.83 0.30 - 4.20 uIU/mL   CBC with platelets and differential   Result Value Ref Range    WBC Count 10.4 4.0 - 11.0 10e3/uL    RBC Count 4.61 3.80 - 5.20 10e6/uL    Hemoglobin 14.0 11.7 - 15.7 g/dL    Hematocrit 40.5 35.0 - 47.0 %    MCV 88 78 - 100 fL    MCH 30.4 26.5 - 33.0 pg    MCHC 34.6 31.5 - 36.5 g/dL    RDW 14.0 10.0 - 15.0 %    Platelet Count 208 150 - 450 10e3/uL    % Neutrophils 70 %    % Lymphocytes 22 %    % Monocytes 7 %    % Eosinophils 1 %    % Basophils 0 %    % Immature Granulocytes 0 %    Absolute Neutrophils 7.2 1.6 - 8.3 10e3/uL    Absolute  Lymphocytes 2.3 0.8 - 5.3 10e3/uL    Absolute Monocytes 0.7 0.0 - 1.3 10e3/uL    Absolute Eosinophils 0.1 0.0 - 0.7 10e3/uL    Absolute Basophils 0.0 0.0 - 0.2 10e3/uL    Absolute Immature Granulocytes 0.0 <=0.4 10e3/uL       If you have any questions or concerns, please call the clinic at the number listed above.       Sincerely,          Electronically signed

## 2025-03-05 NOTE — TELEPHONE ENCOUNTER
"----- Message from Abisai Harper sent at 3/5/2025  1:24 PM CST -----  Please send a \"normal Meaghan\" result note.     Abisai Harper MD  [facility] [place] [address]  Phone: [phone]  Fax: [phone]    3/5/2025  1:23 PM    "

## 2025-03-19 ENCOUNTER — PATIENT OUTREACH (OUTPATIENT)
Dept: CARE COORDINATION | Facility: CLINIC | Age: 40
End: 2025-03-19
Payer: COMMERCIAL

## 2025-04-02 ENCOUNTER — PATIENT OUTREACH (OUTPATIENT)
Dept: CARE COORDINATION | Facility: CLINIC | Age: 40
End: 2025-04-02
Payer: COMMERCIAL

## 2025-04-10 ENCOUNTER — PATIENT OUTREACH (OUTPATIENT)
Dept: CARE COORDINATION | Facility: CLINIC | Age: 40
End: 2025-04-10
Payer: COMMERCIAL

## 2025-04-21 ENCOUNTER — ALLIED HEALTH/NURSE VISIT (OUTPATIENT)
Dept: FAMILY MEDICINE | Facility: CLINIC | Age: 40
End: 2025-04-21
Payer: COMMERCIAL

## 2025-04-21 ENCOUNTER — NURSE TRIAGE (OUTPATIENT)
Dept: FAMILY MEDICINE | Facility: CLINIC | Age: 40
End: 2025-04-21

## 2025-04-21 ENCOUNTER — TELEPHONE (OUTPATIENT)
Dept: FAMILY MEDICINE | Facility: CLINIC | Age: 40
End: 2025-04-21

## 2025-04-21 ENCOUNTER — PATIENT OUTREACH (OUTPATIENT)
Dept: CARE COORDINATION | Facility: CLINIC | Age: 40
End: 2025-04-21

## 2025-04-21 DIAGNOSIS — Z30.42 DEPO-PROVERA CONTRACEPTIVE STATUS: Primary | ICD-10-CM

## 2025-04-21 PROCEDURE — 96372 THER/PROPH/DIAG INJ SC/IM: CPT | Performed by: FAMILY MEDICINE

## 2025-04-21 PROCEDURE — 99207 PR NO CHARGE NURSE ONLY: CPT

## 2025-04-21 RX ADMIN — MEDROXYPROGESTERONE ACETATE 150 MG: 150 INJECTION, SUSPENSION INTRAMUSCULAR at 10:45

## 2025-04-21 NOTE — PROGRESS NOTES
Clinic Administered Medication Documentation      Depo Provera Documentation    Depo-Provera Standing Order inclusion/exclusion criteria reviewed.     Is this the initial or subsequent dose of Depo Provera? Subsequent dose - patient is within the acceptable window of time (11-15 weeks) for subsequent injection. Pregnancy test not indicated.    Patient meets: inclusion criteria     Is there an active order (written within the past 365 days, with administrations remaining, not ) in the chart? Yes.     Prior to injection, verified patient identity using patient's name and date of birth. Medication was administered. Please see MAR and medication order for additional information.     Vial/Syringe: Single dose vial. Was entire vial of medication used? Yes    Patient instructed to remain in clinic for 15 minutes and report any adverse reaction to staff immediately.  NEXT INJECTION DUE: 25 - 25    Patient has no refills remaining.  Will talk to doctor at her next appt.

## 2025-04-21 NOTE — PROGRESS NOTES
Clinic Care Coordination Contact  Community Health Worker Initial Outreach  Spoke with patient Tevin Harding  ID [id number]    CHW Initial Information Gathering:  Referral Source: PCP  Current living arrangement:: I live in a private home with family  Type of residence:: Apartment  Community Resources: Financial/Insurance, [place] Programs [place]SNAP[place]  Supplies Currently Used at Home: None  Equipment Currently Used at Home: none  Informal Support system:: Family  No PCP office visit in Past Year: No  Transportation means:: Regular car  CHW Additional Questions  If ED/Hospital discharge, follow-up appointment scheduled as recommended?: N/A  Medication changes made following ED/Hospital discharge?: N/A  MyChart active?: No  Patient agreeable to assistance with activating MyChart?: No    Patient accepts CC: Yes. Patient scheduled for assessment with CCSW on 5/2/25 at 2pm. Patient noted desire to discuss applying for public housing.     CHW reviewed that public housing waiting list is currently closed. Patient is still interested in appointment with CCSW to see if any other resources are available.     Polly Gunn  Community Health Worker   Clinic Care Coordination  [facility]    Phone: [phone]

## 2025-04-21 NOTE — TELEPHONE ENCOUNTER
Patient is requesting assistance in applying for public housing. She does not know the process or how to start it. She would like help.     Patient can be reached at [phone].      AMAURI Leggett CemN RN  [facility]

## 2025-04-21 NOTE — TELEPHONE ENCOUNTER
Nurse Triage SBAR    Is this a 2nd Level Triage? NO    Situation:   Nausea x 1 week. Itchy eye/ face x 1 month    Background:   - has experienced nausea in the past and taken medications for it. Does not remember the name.    Assessment:   - nausea is on and off  - no vomiting  - still eating normally   - no fever  - has some redness in the eye and it is itchy. Has eyedrop for ithcy eye but doesn't work for her  - her whole face is itchy too    Protocol Recommended Disposition:   See in Office Within 3 Days    Recommendation: offered appt later today but she refused. appt scheduled tomorrow.       JESSE Leggett Cem, RN  [facility]      Reason for Disposition   Nausea lasts > 1 week    Additional Information   Negative: Shock suspected (e.g., cold/pale/clammy skin, too weak to stand, low BP, rapid pulse)   Negative: Sounds like a life-threatening emergency to the triager   Negative: Nausea or vomiting and pregnancy < 20 weeks   Negative: Menstrual Period - Missed or Late (i.e., pregnancy suspected)   Negative: Heat exhaustion suspected (i.e., dehydration from heat exposure)   Negative: Anxiety or stress suspected (i.e., nausea with anxiety attacks or stressful situations)   Negative: Traumatic Brain Injury (TBI) suspected   Negative: Vomiting occurs   Negative: Other symptom is present, see that guideline.  (e.g., chest pain, headache, dizziness, abdominal pain, colds, sore throat, etc.).   Negative: Unable to walk, or can only walk with assistance (e.g., requires support)   Negative: Difficulty breathing   Negative: Insulin-dependent diabetes (Type I) and glucose > 400 mg/dL (22 mmol/L)   Negative: Drinking very little and dehydration suspected (e.g., no urine > 12 hours, very dry mouth, very lightheaded)   Negative: Patient sounds very sick or weak to the triager   Negative: Fever > 104 F  (40 C)   Negative: Fever > 101 F  (38.3 C) and over 60 years of age   Negative: Fever > 100.0 F  (37.8  "C) and bedridden (e.g., CVA, chronic illness, recovering from surgery)   Negative: Fever > 100.0 F  (37.8 C) and diabetes mellitus or weak immune system (e.g., HIV positive, cancer chemo, splenectomy, chronic steroids)   Negative: Taking any of the following medications: digoxin (Lanoxin), lithium, theophylline, phenytoin (Dilantin)   Negative: Yellowish color of the skin or white of the eye (i.e., jaundice)   Negative: Fever present > 3 days (72 hours)   Negative: Patient wants to be seen   Negative: Receiving cancer chemotherapy medication   Negative: Taking prescription medication that could cause nausea (e.g., narcotics/opiates, antibiotics, OCPs, many others)    Answer Assessment - Initial Assessment Questions  1. NAUSEA SEVERITY: \"How bad is the nausea?\" (e.g., mild, moderate, severe; dehydration, weight loss)    - MILD: loss of appetite without change in eating habits    - MODERATE: decreased oral intake without significant weight loss, dehydration, or malnutrition    - SEVERE: inadequate caloric or fluid intake, significant weight loss, symptoms of dehydration      Mild nausea    2. ONSET: \"When did the nausea begin?\"      1 week ago    3. VOMITING: \"Any vomiting?\" If Yes, ask: \"How many times today?\"      No    4. RECURRENT SYMPTOM: \"Have you had nausea before?\" If Yes, ask: \"When was the last time?\" \"What happened that time?\"      Yes, and took medication for it    5. CAUSE: \"What do you think is causing the nausea?\"      Not sure    6. PREGNANCY: \"Is there any chance you are pregnant?\" (e.g., unprotected intercourse, missed birth control pill, broken condom)      no    Protocols used: Nausea-A-OH    "

## 2025-04-22 ENCOUNTER — OFFICE VISIT (OUTPATIENT)
Dept: FAMILY MEDICINE | Facility: CLINIC | Age: 40
End: 2025-04-22
Payer: COMMERCIAL

## 2025-04-22 VITALS
HEART RATE: 94 BPM | DIASTOLIC BLOOD PRESSURE: 68 MMHG | SYSTOLIC BLOOD PRESSURE: 106 MMHG | WEIGHT: 165 LBS | HEIGHT: 60 IN | OXYGEN SATURATION: 99 % | RESPIRATION RATE: 20 BRPM | TEMPERATURE: 98.4 F | BODY MASS INDEX: 32.39 KG/M2

## 2025-04-22 DIAGNOSIS — M54.6 ACUTE BILATERAL THORACIC BACK PAIN: ICD-10-CM

## 2025-04-22 DIAGNOSIS — H10.13 ALLERGIC CONJUNCTIVITIS, BILATERAL: Primary | ICD-10-CM

## 2025-04-22 PROCEDURE — 3074F SYST BP LT 130 MM HG: CPT

## 2025-04-22 PROCEDURE — 3078F DIAST BP <80 MM HG: CPT

## 2025-04-22 PROCEDURE — 99213 OFFICE O/P EST LOW 20 MIN: CPT

## 2025-04-22 PROCEDURE — G2211 COMPLEX E/M VISIT ADD ON: HCPCS

## 2025-04-22 RX ORDER — CETIRIZINE HYDROCHLORIDE 10 MG/1
10 TABLET ORAL DAILY
Qty: 90 TABLET | Refills: 0 | Status: SHIPPED | OUTPATIENT
Start: 2025-04-22

## 2025-04-22 RX ORDER — NAPROXEN 500 MG/1
500 TABLET ORAL 2 TIMES DAILY WITH MEALS
Qty: 28 TABLET | Refills: 0 | Status: SHIPPED | OUTPATIENT
Start: 2025-04-22

## 2025-04-22 RX ORDER — HYDROXYZINE HYDROCHLORIDE 10 MG/1
10-20 TABLET, FILM COATED ORAL EVERY 8 HOURS PRN
Qty: 60 TABLET | Refills: 5 | Status: SHIPPED | OUTPATIENT
Start: 2025-04-22

## 2025-04-22 NOTE — PROGRESS NOTES
Assessment & Plan    Allergic conjunctivitis, bilateral:    - Differentials considered include viral conjunctivitis and bacterial conjunctivitis, but these are less likely due to the seasonal nature of symptoms and the presence of nasal pruritus.  - The diagnosis is supported by the presence of itchy eyes and nose during a high pollen season.  - Recommend trying an allergy pill daily to see if it helps with symptoms. Refill hydroxyzine for itching.  - Would consider referral to an allergist if symptoms do not improve with medication.      - hydrOXYzine HCl (ATARAX) 10 MG tablet  Dispense: 60 tablet; Refill: 5  - cetirizine (ZYRTEC) 10 MG tablet  Dispense: 90 tablet; Refill: 0      Acute bilateral thoracic back pain:    - Differentials considered include muscle strain and herniated disc, but these are less likely due to the absence of radiating pain and lack of exacerbation with movement.  - The diagnosis is supported by the description of pain as on and off, localized, and without radiating symptoms.  - Prescribe naproxen to help with pain and inflammation.  - Would consider imaging or referral to a specialist if pain does not improve with medication.     - naproxen (NAPROSYN) 500 MG tablet  Dispense: 28 tablet; Refill: 0      Please seek immediate medical attention (go to the emergency room or urgent care) if symptoms worser or for concerning changes.    Follow-up if symptoms do not improve as anticipated, as needed for acute concern, and May schedule follow-up with me for items discussed today and medications ordered if unable to see PCP due to scheduling availability     ---------------------------------------------------------------------------------------   Subjective   Say is a 40 year old year old female, presenting for the following health issues:  Chief Complaint   Patient presents with    Eye Problem     C/o eye itching, watery, irritation for 3 weeks    Back Pain         4/22/2025     4:11 PM   Additional  Questions   Accompanied by Helen KELLY     HPI  Say Hiwot Gross, a 40-year-old female, reports experiencing itchy eyes for four weeks. She has not had this symptom before. Additionally, she sometimes experiences an itchy nose. She has been dealing with back pain, which she describes as affecting her whole back, for a few days. The pain is on and off and is mostly localized to a specific area on her back. She does not recall any injury to her back. The pain does not radiate down her legs, and she has not identified any specific activities that exacerbate it. She has been taking medication prescribed by a doctor for the pain, which she finds helpful, although she does not remember the name of the medication. She also mentioned using hydroxyzine for itching, which she finds effective.        Patient submitted visit information  Answers submitted by the patient for this visit:  Provider Visit on 4/22/2025  4:45 PM with Kobi Cortes  General Questionnaire (Submitted on 4/22/2025)  Chief Complaint: Chronic problems general questions HPI Form  What is the reason for your visit today? : eye itching  How many days per week do you miss taking your medication?: 0    ---------------------------------------------------------------------------------------   Objective    Vital signs: /68 (BP Location: Left arm)   Pulse 94   Temp 98.4  F (36.9  C) (Oral)   Resp 20   Ht 1.524 m (5')   Wt 74.8 kg (165 lb)   SpO2 99%   BMI 32.22 kg/m      Body mass index is 32.22 kg/m .    Physical Exam    General appearance: alert, well appearing, and in no distress  Mental status: alert, oriented to person, place, and time  Eyes: pruritus of eyes  Nose: pruritus of nose  Back exam: full range of motion, no tenderness, palpable spasm or pain on motion  Musculoskeletal: generalized back pain, no exacerbation of pain with lateral flexion, no exacerbation of pain with forward flexion      ---------------------------------------------------------------------------------------   Visit was completed along with virtual     Options for treatment and follow-up care were reviewed with the patient. Yvon Mi Renato and/or guardian was engaged and actively involved in the decision making process. Yvon Mi Paw and/or guardian verbalized understanding of the options discussed and was satisfied with the final plan.    The longitudinal plan of care for the diagnosis(es)/condition(s) as documented were addressed during this visit. Due to the added complexity in care, I can continue to support Yvon in the subsequent management and with ongoing continuity of care related to these items if unable to see (or establish with) PCP.       Patient consented to use of ambient AI scribe prior to initiation of visit.    Signed Electronically by: LAURENCE De La Vega CNP

## 2025-04-24 ENCOUNTER — PATIENT OUTREACH (OUTPATIENT)
Dept: CARE COORDINATION | Facility: CLINIC | Age: 40
End: 2025-04-24
Payer: COMMERCIAL

## 2025-04-29 ENCOUNTER — TELEPHONE (OUTPATIENT)
Dept: FAMILY MEDICINE | Facility: CLINIC | Age: 40
End: 2025-04-29
Payer: COMMERCIAL

## 2025-04-29 NOTE — TELEPHONE ENCOUNTER
Patient Quality Outreach    Patient is due for the following:   Breast Cancer Screening - Mammogram  Physical Preventive Adult Physical      Topic Date Due    Flu Vaccine (1) Never done    COVID-19 Vaccine (1 - 2024-25 season) Never done       Action(s) Taken:   Patient has upcoming appointment, these items will be addressed at that time.5/27/25    Type of outreach:    Chart review performed, no outreach needed.    Questions for provider review:    None         Ambika Maguire MA  Chart routed to None.

## 2025-05-02 ENCOUNTER — PATIENT OUTREACH (OUTPATIENT)
Dept: FAMILY MEDICINE | Facility: CLINIC | Age: 40
End: 2025-05-02
Payer: COMMERCIAL

## 2025-05-02 DIAGNOSIS — Z71.89 OTHER SPECIFIED COUNSELING: Primary | Chronic | ICD-10-CM

## 2025-05-02 ASSESSMENT — ACTIVITIES OF DAILY LIVING (ADL): DEPENDENT_IADLS:: INDEPENDENT

## 2025-05-02 NOTE — LETTER
[facility]  Patient Centered Plan of Care  About Me:        Patient Name:  Yvon Gross    YOB: 1985  Age:         40 year old   Cristian MRN:    [id number] Telephone Information:  Home Phone [phone]   Mobile [phone]       Address:  [address]  [place] [address] Email address:  No e-mail address on record      Emergency Contact(s)    Name Relationship Lgl Grd Work Phone Home Phone Mobile Phone   1. IAN RUIZ Cousin   [phone]            Primary language:  [place]     needed? Yes   [facility]:  [phone] op. 1  Other communication barriers:Language barrier    Preferred Method of Communication:     Current living arrangement: I live in a private home with family    Mobility Status/ Medical Equipment: Independent        Health Maintenance  Health Maintenance Reviewed: Due/Overdue   Health Maintenance Due   Topic Date Due    ADVANCE CARE PLANNING  Never done    MAMMO SCREENING  Never done    COVID-19 Vaccine (1 - 2024-25 season) Never done    YEARLY PREVENTIVE VISIT  04/18/2025     My Access Plan  Medical Emergency 911   Primary Clinic Line [facility] [phone]   24 Hour Appointment Line [phone] or  [phone]) (toll-free)   24 Hour Nurse Line [phone] (toll-free)   Preferred Urgent Care No data recorded   Preferred Hospital No data recorded   Preferred Pharmacy [facility] - [place], [place] - [address]     Behavioral Health Crisis Line The National Suicide Prevention Lifeline at 1-456.331.5847 or Text/Call 698           My Care Team Members  Patient Care Team         Relationship Specialty Notifications Start End    Abisai Harper MD PCP - General Family Medicine  7/11/24     Phone: [phone] Fax: [phone]         [address] [place] [address]    Abisai Harper MD Assigned PCP   5/23/24     Phone: [phone] Fax: [phone]         [address] [place] [address]  [facility] [address] [place] [address]    Zarina Amato LICSW Lead Care Coordinator  Admissions 5/2/25     Polly Gunn CHW Community Health Worker Primary Care - CC Admissions 5/2/25     Phone: [phone]                     My Care Plans  Self Management and Treatment Plan    Care Plan  Care Plan: General       Problem: HP GENERAL PROBLEM       Goal: I will review housing resources within 3 months.       Start Date: 5/2/2025 Expected End Date: 8/5/2025    This Visit's Progress: 10%    Priority: Medium    Note:     Barriers: Language  Strengths: Motivated and willing to accept Care Coordination  Patient expressed understanding of goal: Yes  Action steps to achieve this goal:  I will received housing resources via mail from Care Coordination.  I will access housing resources by working with Meaghan Ying.  I will reach out to Care Coordination with questions or concerns.                                 Action Plans on File:                       Advance Care Plans/Directives:   Advanced Care Plan/Directives on file: No    Discussed with patient/caregiver(s): No data recorded           My Medical and Care Information  Problem List   Patient Active Problem List   Diagnosis    Anxiety    Epigastric burning sensation    Dizziness    Tobacco use disorder    Encounter for counseling regarding contraception    Dermatographia    Low energy      Current Medications:  Please refer to the most recent medication list provided to you by your medical team and reach out to your provider with any questions or to make any corrections.    Care Coordination Start Date: 4/21/2025   Frequency of Care Coordination: monthly, more frequently as needed     Form Last Updated: 05/05/2025

## 2025-05-02 NOTE — LETTER
?? Say N Paw,     ????? ????????????????????????? ???????????????? ?????????????? Dr. Abisai Harper ??[facility]???????. ??????????????????????????? ??????????????????????????????????????, ??? ?????????????????????????????????????????????????????????????????????????????????????.    ?????????????????????????????????????????????????????????????????????????????????????????????????, ????????????????????, ??? ???????????????????????????????. ???????????????????????????????????????????????????????????????????????????????????????????? ??? ??????????????????????????????????????????????????????????????????????????????????????????????????. ??????????????????????????????????????????????????????????????????????????????????????????????????????????????????, ????????????????????????, ????????????????????????????????????????????????????????, ???????????????????????????????????????????????????????????????????????????.    ???????????????????????????????????? Zarina Amato, LICSW?? [phone] ????????????????????????????? ??????? ????????????????????????????????????????. ????????????????????????????????????????????????????????????????????????????????????. ???????????????????????????????????????????????????????????????.    ????????????????????,     Zarina Amato [facility]  Social Work Care Coordinator  [facility]  [email or web address]  Office: [phone]  Gender pronouns: she/her    Enclosed you will find resources to assist you.                        Dear Say ASHLEY Gross,    I am a clinic care coordinator who works with Dr. Abisai Harper at [facility]. I'm writing to tell you about clinic care coordination, and how we may be able to support you in achieving your health-related goals.    Our clinic care coordination team consists of a registered nurse, , and community health worker. The goal of clinic care coordination is to help  you manage your health and to improve your access to the health care system in an efficient way. Our team can help you meet your health care goals by providing you with health information, coordinating services, strengthening the communication among your providers, and supporting you with any resource needs.    Please feel free to contact CHERYL Eugene at [phone] with any questions or concerns. We are focused on providing you with the highest-quality health care experience possible. We want to help you achieve and maintain your health goals.    Sincerely,     CHERYL Eugene  Social Work Care Coordinator  [facility]  [email or web address]  Office: [phone]  Gender pronouns: she/her    Enclosed you will find resources to assist you.                                                    Below you will find resources on housing that include:    1.[facility] Housing Services & Support  Contact Info  Phone number: [phone]  Website:[email or web address]-support/assistance/housing-services-support  *[facility] works with a variety of local agencies to provide resources for people who are experiencing homelessness, at risk of becoming homeless or need emergency assistance to stay in their homes.    2. [facility]1-[facility]  Contact Info  Toll Free: [phone]  Local: [phone]  Text your zip code to [phone]*  Website: https://[address] [place] [address].org/  *[facility] 2[facility]1-1 provides free and confidential health and human services information for people in [place]. We re here 24 hours a day, 7 days a week to connect you with the resources and information you need.    3. Housing Link  Contact Info  Website: https://www.housinglink.org/  *Housing Link can help find affordable housing, gives information about the waitlist for Section 8 in [place], and a tool that is available to assist with housing questions.    4. The 30-Days  Foundation  Contact Info  Website: https://www.ene96-[facility]wa[facility]ug.org/  *[facility] offers a variety of programs to help individuals and families who are in real-life financial crisis with a one-time alise to ease some financial burden.  The 30-Days Foundation has a maximum amount of $500 that is possible to donate for one request. However, this amount is in no way a guarantee.    5. [facility]  Contact Info  Website: https://[facility].org/programs/housing-stability/  *[employer] has a Housing Stability program that prevents evictions and homelessness through emergency financial assistance and working collaboratively with [facility] and community partners to locate available resources. They follow a  housing first  model of care because they believe that safe housing is the foundation needed to address any other concerns in a person's life. In crisis situations, staff intervene to restore families to equilibrium and to prevent future crises by providing a continuum of services.  *[facility] will ask you if you already reached out to [facility] to access [facility] benefits.  *Apply on the website where you can find the application. They also have walk-in hours to help apply. Hours are: Mondays 10 - 12 p.m., Wednesdays 3 - 5 p.m., Fridays 1 - 3 p.m.

## 2025-05-05 NOTE — PROGRESS NOTES
Clinic Care Coordination Contact  Clinic Care Coordination Contact  OUTREACH    Referral Information:  Referral Source: PCP    Primary Diagnosis: Psychosocial    Chief Complaint   Patient presents with    Clinic Care Coordination - Initial     LORRI CC called patient to complete initial outreach assessment. Patient answered phone and voiced she is doing well. Patient voiced that her main concern is wanting to find alternative housing and move into a house. Patient voiced she wanted to get on public housing because her  is the only one who works and they have a lot of people in their apartment. Patient voiced that they aren't at risk of losing housing.    LORRI CC discussed that public housing waiting list is closed, but patient can talk to [facility] if she is wanting additional resources from the [facility] and can look at Housing Link for more affordable housing options. LORRI CC also discussed [facility] benefits including SNAP/Cash. LORRI CC also discussed food shelves. Patient voiced interest in getting housing resources, but voiced that she would need help calling. LORRI CC discussed Meaghan Kuhn  who can assist with her accessing housing resources. Patient understands and requested this referral. Patient also requested Financial Resource Worker (FRW) referral to assist with applying for cash assistance. Patient voiced she does have SNAP and accessing food shelves. Patient voiced no further questions or concerns at this time.    LORRI CC made referral for FRW and Meaghan Kuhn .     Care Coordination will follow-up with patient monthly and as needed.     Universal Utilization: See information below  Clinic Utilization  Difficulty keeping appointments:: Yes  Compliance Concerns: Yes  No-Show Concerns: Yes  No PCP office visit in Past Year: No  Utilization      No Show Count (past year)  4             ED Visits  0             Hospital Admissions  0                    Current as of: 5/5/2025 10:59 AM                 Clinical Concerns:  Current Medical Concerns:  NA    Current Behavioral Concerns: Wants a bigger house    Education Provided to patient: LORRI DODSON discussed housing resources and Meaghan Cultural  who can assist. LORRI DODSON did discuss housing stabilization program, but patient voiced she is not at risk for being homeless and doesn't have a disability.       Health Maintenance Reviewed: Due/Overdue   Health Maintenance Due   Topic Date Due    ADVANCE CARE PLANNING  Never done    MAMMO SCREENING  Never done    COVID-19 Vaccine (1 - 2024-25 season) Never done    YEARLY PREVENTIVE VISIT  04/18/2025       Clinical Pathway: None    Medication Management:  Medication review status: Medication not reviewed at this time.   Current Outpatient Medications   Medication Sig Dispense Refill    acetaminophen (TYLENOL) 500 MG tablet Take 2 tablets (1,000 mg) by mouth 3 times daily as needed for mild pain (Patient not taking: Reported on 4/22/2025) 100 tablet 3    cetirizine (ZYRTEC) 10 MG tablet Take 1 tablet (10 mg) by mouth daily. 90 tablet 0    diclofenac (VOLTAREN) 1 % topical gel Apply 4 g topically 4 times daily as needed (leg pain) (Patient not taking: Reported on 4/22/2025) 350 g 3    hydrOXYzine HCl (ATARAX) 10 MG tablet Take 1-2 tablets (10-20 mg) by mouth every 8 hours as needed for itching or anxiety (sleep). 60 tablet 5    meclizine (ANTIVERT) 25 MG tablet Take 1 tablet (25 mg) by mouth 3 times daily as needed for dizziness 30 tablet 5    multivitamin (ONE-DAILY) tablet Take 1 tablet by mouth daily (Patient not taking: Reported on 4/22/2025) 100 tablet 3    naproxen (NAPROSYN) 500 MG tablet Take 1 tablet (500 mg) by mouth 2 times daily (with meals). 28 tablet 0    pantoprazole (PROTONIX) 40 MG EC tablet Take 1 tablet (40 mg) by mouth daily. (Patient not taking: Reported on 4/22/2025) 90 tablet 2     No current facility-administered medications for this visit.     Functional Status:  Dependent ADLs::  Independent  Dependent IADLs:: Independent  Bed or wheelchair confined:: No  Mobility Status: Independent    Living Situation:  Current living arrangement:: I live in a private home with family  Type of residence:: Apartment    Lifestyle & Psychosocial Needs:    Social Drivers of Health     Food Insecurity: Low Risk  (4/17/2024)    Food Insecurity     Within the past 12 months, did you worry that your food would run out before you got money to buy more?: No     Within the past 12 months, did the food you bought just not last and you didn t have money to get more?: No   Depression: Not at risk (2/14/2025)    PHQ-2     PHQ-2 Score: 0   Housing Stability: Low Risk  (4/17/2024)    Housing Stability     Do you have housing? : Yes     Are you worried about losing your housing?: No   Tobacco Use: High Risk (4/22/2025)    Patient History     Smoking Tobacco Use: Former     Smokeless Tobacco Use: Current     Passive Exposure: Current   Financial Resource Strain: Low Risk  (4/17/2024)    Financial Resource Strain     Within the past 12 months, have you or your family members you live with been unable to get utilities (heat, electricity) when it was really needed?: No   Alcohol Use: Not on file   Transportation Needs: Low Risk  (4/17/2024)    Transportation Needs     Within the past 12 months, has lack of transportation kept you from medical appointments, getting your medicines, non-medical meetings or appointments, work, or from getting things that you need?: No   Physical Activity: Insufficiently Active (4/17/2024)    Exercise Vital Sign     Days of Exercise per Week: 2 days     Minutes of Exercise per Session: 20 min   Interpersonal Safety: Low Risk  (2/14/2025)    Interpersonal Safety     Do you feel physically and emotionally safe where you currently live?: Yes     Within the past 12 months, have you been hit, slapped, kicked or otherwise physically hurt by someone?: No     Within the past 12 months, have you been  humiliated or emotionally abused in other ways by your partner or ex-partner?: No   Stress: No Stress Concern Present (4/17/2024)    [place] [employer] - Occupational Stress Questionnaire     Feeling of Stress : Only a little   Social Connections: Unknown (4/17/2024)    Social Connection and Isolation Panel [NHANES]     Frequency of Communication with Friends and Family: Not on file     Frequency of Social Gatherings with Friends and Family: Never     Attends Zoroastrianism Services: Not on file     Active Member of Clubs or Organizations: Not on file     Attends Club or Organization Meetings: Not on file     Marital Status: Not on file   Health Literacy: Not on file     Diet:: Regular  Transportation means:: Regular car     Informal Support system:: Family      Resources and Interventions:  Current Resources:      Community Resources: Financial/Insurance, County Programs (SNAP)  Supplies Currently Used at Home: None  Equipment Currently Used at Home: none  Employment Status: unemployed         Advance Care Plan/Directive  Advanced Care Plans/Directives on file:: No    Referrals Placed: Other - Financial Services and Meaghan Ying    Care Plan:  Care Plan: General       Problem: HP GENERAL PROBLEM       Goal: I will review housing resources within 3 months.       Start Date: 5/2/2025 Expected End Date: 8/5/2025    This Visit's Progress: 10%    Priority: Medium    Note:     Barriers: Language  Strengths: Motivated and willing to accept Care Coordination  Patient expressed understanding of goal: Yes  Action steps to achieve this goal:  I will receive housing resources via mail from Care Coordination.  I will access housing resources by working with Meaghan Ying.  I will reach out to Care Coordination with questions or concerns.                                 Patient/Caregiver understanding: Patient reports understanding and denies any additional questions or concerns at this times.  LORRI CC engaged in AIDET communication during encounter.    Outreach Frequency: monthly, more frequently as needed  Future Appointments                In 3 weeks Tej Lee MD [facility] JACOB Reich SPRO    In 2 months SPRO MA/LPN [facility] JACOB Reich SPRO            Plan: Patient will review housing resources that LORRI DODSON sent with Meaghan Ying. Patient will work with Financial Resource Worker (FRW) on applying for cash assistance. Patient will reach out to Care Coordinator with any questions or concerns.     Care Coordination will follow-up monthly and as needed.    CHERYL Eugene  Social Work Care Coordinator  [facility]  [email or web address]  Office: [phone]  Gender pronouns: she/her

## 2025-05-08 ENCOUNTER — PATIENT OUTREACH (OUTPATIENT)
Dept: CARE COORDINATION | Facility: CLINIC | Age: 40
End: 2025-05-08
Payer: COMMERCIAL

## 2025-05-14 ENCOUNTER — PATIENT OUTREACH (OUTPATIENT)
Dept: CARE COORDINATION | Facility: CLINIC | Age: 40
End: 2025-05-14
Payer: COMMERCIAL

## 2025-05-14 NOTE — PROGRESS NOTES
FRW UPDATE :  5/14/25 - FRQUIANA completed county benefit application on May 14, 2025. FRW will follow up within 30 days.

## 2025-06-16 ENCOUNTER — PATIENT OUTREACH (OUTPATIENT)
Dept: CARE COORDINATION | Facility: CLINIC | Age: 40
End: 2025-06-16
Payer: COMMERCIAL

## 2025-06-16 NOTE — PROGRESS NOTES
FRW UPDATE :  6/16/25 -  was not able to LVM for patient. Established patient outreach attempted x 1 was unable to reach. Left message on voicemail with call back information and requested return call.  Plan: Current outreach date reflects FRW 's follow up within 30 days.

## 2025-06-16 NOTE — LETTER
[facility] CARE COORDINATION  [address] [place] [address]  [place] [address]    June 16, 2025    Say Hiwot Gross  [address]   [place] [address]      Dear Say,    We have been unsuccessful in reaching you since our last contact. At this time the Care Coordination team will make no further attempts to reach you, however this does not change your ability to continue receiving care from your providers at your primary care clinic. If you need additional support from a care coordinator in the future please contact Zarina Amato at [phone].    We are focused on providing you with the highest-quality healthcare experience possible.    Sincerely,    Zarina Amato, [facility]  Social Work Care Coordinator  [facility]  [email or web address]  Office: [phone]  Gender pronouns: she/her     Revlimid refill processed to [facility]. Kasey Dennison [id number]  Confirmation rec'd.   Front to scan

## 2025-06-16 NOTE — PROGRESS NOTES
Clinic Care Coordination Contact    Situation: Patient chart reviewed by care coordinator.    Background: Patient enrolled in clinic care coordination.     Assessment: [facility] X 2. Disenrollment letter sent via mail.     Plan/Recommendations: Care Coordination will be closed. No further outreaches planned. A new Care Coordination referral can be placed if patient has ongoing needs.     Zarina Amato [facility]  Social Work Care Coordinator  [facility]  [email or web address]  Office: [phone]  Gender pronouns: she/her

## 2025-07-14 ENCOUNTER — PATIENT OUTREACH (OUTPATIENT)
Dept: CARE COORDINATION | Facility: CLINIC | Age: 40
End: 2025-07-14

## 2025-07-14 ENCOUNTER — TELEPHONE (OUTPATIENT)
Dept: FAMILY MEDICINE | Facility: CLINIC | Age: 40
End: 2025-07-14

## 2025-07-14 DIAGNOSIS — Z30.09 ENCOUNTER FOR COUNSELING REGARDING CONTRACEPTION: Primary | ICD-10-CM

## 2025-07-14 RX ORDER — MEDROXYPROGESTERONE ACETATE 150 MG/ML
150 INJECTION, SUSPENSION INTRAMUSCULAR
Status: ACTIVE | OUTPATIENT
Start: 2025-07-14 | End: 2026-07-09

## 2025-07-14 NOTE — TELEPHONE ENCOUNTER
Covering provider. Appear Depo originally started 7/2024. Will order one more year of injections. Will need to consider alternative contraceptives after this due to increased risk of osteoporosis associated with use longer than 2 years.

## 2025-07-29 ENCOUNTER — ALLIED HEALTH/NURSE VISIT (OUTPATIENT)
Dept: FAMILY MEDICINE | Facility: CLINIC | Age: 40
End: 2025-07-29
Payer: COMMERCIAL

## 2025-07-29 DIAGNOSIS — Z30.42 DEPO-PROVERA CONTRACEPTIVE STATUS: Primary | ICD-10-CM

## 2025-07-29 PROCEDURE — 96372 THER/PROPH/DIAG INJ SC/IM: CPT

## 2025-07-29 PROCEDURE — 99207 PR NO CHARGE NURSE ONLY: CPT

## 2025-07-29 RX ADMIN — MEDROXYPROGESTERONE ACETATE 150 MG: 150 INJECTION, SUSPENSION INTRAMUSCULAR at 11:03

## 2025-07-29 NOTE — PROGRESS NOTES
Clinic Administered Medication Documentation      Depo Provera Documentation    Depo-Provera Standing Order inclusion/exclusion criteria reviewed.     Is this the initial or subsequent dose of Depo Provera? Subsequent dose - patient is within the acceptable window of time (11-15 weeks) for subsequent injection. Pregnancy test not indicated.    Patient meets: inclusion criteria     Is there an active order (written within the past 365 days, with administrations remaining, not ) in the chart? Yes.     Prior to injection, verified patient identity using patient's name and date of birth. Medication was administered. Please see MAR and medication order for additional information.     Vial/Syringe: Single dose vial. Was entire vial of medication used? Yes    Patient instructed to remain in clinic for 15 minutes and report any adverse reaction to staff immediately.  NEXT INJECTION DUE: 10/14/25 - 25    Verified that the patient has refills remaining in their prescription.

## 2025-08-20 ENCOUNTER — PATIENT OUTREACH (OUTPATIENT)
Dept: CARE COORDINATION | Facility: CLINIC | Age: 40
End: 2025-08-20
Payer: COMMERCIAL